# Patient Record
Sex: MALE | Race: WHITE | NOT HISPANIC OR LATINO | Employment: UNEMPLOYED | URBAN - NONMETROPOLITAN AREA
[De-identification: names, ages, dates, MRNs, and addresses within clinical notes are randomized per-mention and may not be internally consistent; named-entity substitution may affect disease eponyms.]

---

## 2023-10-05 ENCOUNTER — APPOINTMENT (INPATIENT)
Dept: ULTRASOUND IMAGING | Facility: HOSPITAL | Age: 63
DRG: 720 | End: 2023-10-05
Payer: COMMERCIAL

## 2023-10-05 ENCOUNTER — HOSPITAL ENCOUNTER (INPATIENT)
Facility: HOSPITAL | Age: 63
LOS: 2 days | Discharge: ANOTHER HOSPITAL | DRG: 720 | End: 2023-10-07
Attending: EMERGENCY MEDICINE | Admitting: FAMILY MEDICINE
Payer: COMMERCIAL

## 2023-10-05 ENCOUNTER — APPOINTMENT (EMERGENCY)
Dept: CT IMAGING | Facility: HOSPITAL | Age: 63
DRG: 720 | End: 2023-10-05
Payer: COMMERCIAL

## 2023-10-05 ENCOUNTER — APPOINTMENT (EMERGENCY)
Dept: RADIOLOGY | Facility: HOSPITAL | Age: 63
DRG: 720 | End: 2023-10-05
Payer: COMMERCIAL

## 2023-10-05 DIAGNOSIS — K56.609 SBO (SMALL BOWEL OBSTRUCTION) (HCC): ICD-10-CM

## 2023-10-05 DIAGNOSIS — A52.3 NEUROSYPHILIS: ICD-10-CM

## 2023-10-05 DIAGNOSIS — R41.0 CONFUSION: ICD-10-CM

## 2023-10-05 DIAGNOSIS — F03.B11 MODERATE DEMENTIA WITH AGITATION, UNSPECIFIED DEMENTIA TYPE (HCC): ICD-10-CM

## 2023-10-05 DIAGNOSIS — R65.20 SEVERE SEPSIS (HCC): Primary | ICD-10-CM

## 2023-10-05 DIAGNOSIS — K81.0 ACUTE CHOLECYSTITIS: ICD-10-CM

## 2023-10-05 DIAGNOSIS — N17.9 AKI (ACUTE KIDNEY INJURY) (HCC): ICD-10-CM

## 2023-10-05 DIAGNOSIS — A41.9 SEVERE SEPSIS (HCC): Primary | ICD-10-CM

## 2023-10-05 DIAGNOSIS — R56.9 SEIZURE (HCC): ICD-10-CM

## 2023-10-05 PROBLEM — E72.20 HYPERAMMONEMIA (HCC): Status: ACTIVE | Noted: 2023-10-05

## 2023-10-05 PROBLEM — F03.90 DEMENTIA (HCC): Status: ACTIVE | Noted: 2023-10-05

## 2023-10-05 PROBLEM — R94.31 PROLONGED QT INTERVAL: Status: ACTIVE | Noted: 2023-10-05

## 2023-10-05 PROBLEM — I51.7 CARDIOMEGALY: Status: ACTIVE | Noted: 2023-10-05

## 2023-10-05 PROBLEM — I10 HTN (HYPERTENSION): Status: ACTIVE | Noted: 2023-10-05

## 2023-10-05 PROBLEM — G93.41 ACUTE METABOLIC ENCEPHALOPATHY: Status: ACTIVE | Noted: 2023-10-05

## 2023-10-05 PROBLEM — I63.9 CVA (CEREBRAL VASCULAR ACCIDENT) (HCC): Status: ACTIVE | Noted: 2023-10-05

## 2023-10-05 LAB
2HR DELTA HS TROPONIN: -3 NG/L
4HR DELTA HS TROPONIN: -5 NG/L
ALBUMIN SERPL BCP-MCNC: 3.3 G/DL (ref 3.5–5)
ALP SERPL-CCNC: 89 U/L (ref 34–104)
ALT SERPL W P-5'-P-CCNC: 17 U/L (ref 7–52)
AMMONIA PLAS-SCNC: 99 UMOL/L (ref 18–72)
ANION GAP SERPL CALCULATED.3IONS-SCNC: 14 MMOL/L
APTT PPP: 39 SECONDS (ref 23–37)
AST SERPL W P-5'-P-CCNC: 32 U/L (ref 13–39)
ATRIAL RATE: 76 BPM
BACTERIA UR QL AUTO: ABNORMAL /HPF
BASE EX.OXY STD BLDV CALC-SCNC: 88.2 % (ref 60–80)
BASE EXCESS BLDV CALC-SCNC: -1.7 MMOL/L
BASOPHILS # BLD AUTO: 0.07 THOUSANDS/ÂΜL (ref 0–0.1)
BASOPHILS NFR BLD AUTO: 0 % (ref 0–1)
BILIRUB SERPL-MCNC: 0.56 MG/DL (ref 0.2–1)
BILIRUB UR QL STRIP: ABNORMAL
BNP SERPL-MCNC: 218 PG/ML (ref 0–100)
BUN SERPL-MCNC: 60 MG/DL (ref 5–25)
CALCIUM ALBUM COR SERPL-MCNC: 9.2 MG/DL (ref 8.3–10.1)
CALCIUM SERPL-MCNC: 8.6 MG/DL (ref 8.4–10.2)
CARDIAC TROPONIN I PNL SERPL HS: 23 NG/L
CARDIAC TROPONIN I PNL SERPL HS: 25 NG/L
CARDIAC TROPONIN I PNL SERPL HS: 28 NG/L
CHLORIDE SERPL-SCNC: 101 MMOL/L (ref 96–108)
CLARITY UR: ABNORMAL
CO2 SERPL-SCNC: 25 MMOL/L (ref 21–32)
COLOR UR: YELLOW
CREAT SERPL-MCNC: 3.57 MG/DL (ref 0.6–1.3)
EOSINOPHIL # BLD AUTO: 0.01 THOUSAND/ÂΜL (ref 0–0.61)
EOSINOPHIL NFR BLD AUTO: 0 % (ref 0–6)
ERYTHROCYTE [DISTWIDTH] IN BLOOD BY AUTOMATED COUNT: 16.6 % (ref 11.6–15.1)
FLUAV RNA RESP QL NAA+PROBE: NEGATIVE
FLUBV RNA RESP QL NAA+PROBE: NEGATIVE
GFR SERPL CREATININE-BSD FRML MDRD: 17 ML/MIN/1.73SQ M
GLUCOSE SERPL-MCNC: 123 MG/DL (ref 65–140)
GLUCOSE UR STRIP-MCNC: NEGATIVE MG/DL
HCO3 BLDV-SCNC: 22.4 MMOL/L (ref 24–30)
HCT VFR BLD AUTO: 42.2 % (ref 36.5–49.3)
HGB BLD-MCNC: 13.6 G/DL (ref 12–17)
HGB UR QL STRIP.AUTO: ABNORMAL
IMM GRANULOCYTES # BLD AUTO: 0.17 THOUSAND/UL (ref 0–0.2)
IMM GRANULOCYTES NFR BLD AUTO: 1 % (ref 0–2)
INR PPP: 1.21 (ref 0.84–1.19)
KETONES UR STRIP-MCNC: ABNORMAL MG/DL
LACTATE SERPL-SCNC: 1.1 MMOL/L (ref 0.5–2)
LEUKOCYTE ESTERASE UR QL STRIP: ABNORMAL
LYMPHOCYTES # BLD AUTO: 1.11 THOUSANDS/ÂΜL (ref 0.6–4.47)
LYMPHOCYTES NFR BLD AUTO: 6 % (ref 14–44)
MAGNESIUM SERPL-MCNC: 2.8 MG/DL (ref 1.9–2.7)
MCH RBC QN AUTO: 28.5 PG (ref 26.8–34.3)
MCHC RBC AUTO-ENTMCNC: 32.2 G/DL (ref 31.4–37.4)
MCV RBC AUTO: 89 FL (ref 82–98)
MONOCYTES # BLD AUTO: 2.89 THOUSAND/ÂΜL (ref 0.17–1.22)
MONOCYTES NFR BLD AUTO: 15 % (ref 4–12)
NEUTROPHILS # BLD AUTO: 15.39 THOUSANDS/ÂΜL (ref 1.85–7.62)
NEUTS SEG NFR BLD AUTO: 78 % (ref 43–75)
NITRITE UR QL STRIP: NEGATIVE
NON-SQ EPI CELLS URNS QL MICRO: ABNORMAL /HPF
NRBC BLD AUTO-RTO: 0 /100 WBCS
O2 CT BLDV-SCNC: 18.1 ML/DL
P AXIS: 27 DEGREES
PCO2 BLDV: 36.2 MM HG (ref 42–50)
PH BLDV: 7.41 [PH] (ref 7.3–7.4)
PH UR STRIP.AUTO: 5 [PH]
PLATELET # BLD AUTO: 182 THOUSANDS/UL (ref 149–390)
PMV BLD AUTO: 10.5 FL (ref 8.9–12.7)
PO2 BLDV: 58.6 MM HG (ref 35–45)
POTASSIUM SERPL-SCNC: 3.8 MMOL/L (ref 3.5–5.3)
PR INTERVAL: 140 MS
PROCALCITONIN SERPL-MCNC: 2.98 NG/ML
PROT SERPL-MCNC: 8 G/DL (ref 6.4–8.4)
PROT UR STRIP-MCNC: ABNORMAL MG/DL
PROTHROMBIN TIME: 15.6 SECONDS (ref 11.6–14.5)
QRS AXIS: -10 DEGREES
QRSD INTERVAL: 104 MS
QT INTERVAL: 438 MS
QTC INTERVAL: 492 MS
RBC # BLD AUTO: 4.77 MILLION/UL (ref 3.88–5.62)
RBC #/AREA URNS AUTO: ABNORMAL /HPF
RSV RNA RESP QL NAA+PROBE: NEGATIVE
SARS-COV-2 RNA RESP QL NAA+PROBE: NEGATIVE
SODIUM SERPL-SCNC: 140 MMOL/L (ref 135–147)
SP GR UR STRIP.AUTO: >=1.03 (ref 1–1.03)
T WAVE AXIS: 12 DEGREES
UROBILINOGEN UR QL STRIP.AUTO: 4 E.U./DL
VALPROATE SERPL-MCNC: 46 UG/ML (ref 50–100)
VENTRICULAR RATE: 76 BPM
WBC # BLD AUTO: 19.64 THOUSAND/UL (ref 4.31–10.16)
WBC #/AREA URNS AUTO: ABNORMAL /HPF

## 2023-10-05 PROCEDURE — 71250 CT THORAX DX C-: CPT

## 2023-10-05 PROCEDURE — 71045 X-RAY EXAM CHEST 1 VIEW: CPT

## 2023-10-05 PROCEDURE — 72125 CT NECK SPINE W/O DYE: CPT

## 2023-10-05 PROCEDURE — 74176 CT ABD & PELVIS W/O CONTRAST: CPT

## 2023-10-05 PROCEDURE — 80053 COMPREHEN METABOLIC PANEL: CPT | Performed by: PHYSICIAN ASSISTANT

## 2023-10-05 PROCEDURE — 87186 SC STD MICRODIL/AGAR DIL: CPT | Performed by: PHYSICIAN ASSISTANT

## 2023-10-05 PROCEDURE — 80164 ASSAY DIPROPYLACETIC ACD TOT: CPT | Performed by: FAMILY MEDICINE

## 2023-10-05 PROCEDURE — 81001 URINALYSIS AUTO W/SCOPE: CPT | Performed by: PHYSICIAN ASSISTANT

## 2023-10-05 PROCEDURE — 85730 THROMBOPLASTIN TIME PARTIAL: CPT | Performed by: PHYSICIAN ASSISTANT

## 2023-10-05 PROCEDURE — 99285 EMERGENCY DEPT VISIT HI MDM: CPT | Performed by: PHYSICIAN ASSISTANT

## 2023-10-05 PROCEDURE — 83735 ASSAY OF MAGNESIUM: CPT | Performed by: PHYSICIAN ASSISTANT

## 2023-10-05 PROCEDURE — 93005 ELECTROCARDIOGRAM TRACING: CPT

## 2023-10-05 PROCEDURE — 96365 THER/PROPH/DIAG IV INF INIT: CPT

## 2023-10-05 PROCEDURE — G1004 CDSM NDSC: HCPCS

## 2023-10-05 PROCEDURE — 36415 COLL VENOUS BLD VENIPUNCTURE: CPT | Performed by: PHYSICIAN ASSISTANT

## 2023-10-05 PROCEDURE — 83605 ASSAY OF LACTIC ACID: CPT | Performed by: PHYSICIAN ASSISTANT

## 2023-10-05 PROCEDURE — 0241U HB NFCT DS VIR RESP RNA 4 TRGT: CPT | Performed by: PHYSICIAN ASSISTANT

## 2023-10-05 PROCEDURE — 99223 1ST HOSP IP/OBS HIGH 75: CPT | Performed by: FAMILY MEDICINE

## 2023-10-05 PROCEDURE — 85025 COMPLETE CBC W/AUTO DIFF WBC: CPT | Performed by: PHYSICIAN ASSISTANT

## 2023-10-05 PROCEDURE — 82805 BLOOD GASES W/O2 SATURATION: CPT | Performed by: PHYSICIAN ASSISTANT

## 2023-10-05 PROCEDURE — 99285 EMERGENCY DEPT VISIT HI MDM: CPT

## 2023-10-05 PROCEDURE — 84145 PROCALCITONIN (PCT): CPT | Performed by: PHYSICIAN ASSISTANT

## 2023-10-05 PROCEDURE — 82140 ASSAY OF AMMONIA: CPT | Performed by: EMERGENCY MEDICINE

## 2023-10-05 PROCEDURE — 83880 ASSAY OF NATRIURETIC PEPTIDE: CPT | Performed by: PHYSICIAN ASSISTANT

## 2023-10-05 PROCEDURE — 87086 URINE CULTURE/COLONY COUNT: CPT | Performed by: PHYSICIAN ASSISTANT

## 2023-10-05 PROCEDURE — 70450 CT HEAD/BRAIN W/O DYE: CPT

## 2023-10-05 PROCEDURE — 87077 CULTURE AEROBIC IDENTIFY: CPT | Performed by: PHYSICIAN ASSISTANT

## 2023-10-05 PROCEDURE — 76705 ECHO EXAM OF ABDOMEN: CPT

## 2023-10-05 PROCEDURE — 85610 PROTHROMBIN TIME: CPT | Performed by: PHYSICIAN ASSISTANT

## 2023-10-05 PROCEDURE — 87040 BLOOD CULTURE FOR BACTERIA: CPT | Performed by: PHYSICIAN ASSISTANT

## 2023-10-05 PROCEDURE — 84484 ASSAY OF TROPONIN QUANT: CPT | Performed by: PHYSICIAN ASSISTANT

## 2023-10-05 RX ORDER — BISACODYL 5 MG/1
5 TABLET, DELAYED RELEASE ORAL DAILY PRN
COMMUNITY
End: 2023-10-07

## 2023-10-05 RX ORDER — ACETAMINOPHEN 325 MG/1
650 TABLET ORAL EVERY 4 HOURS PRN
COMMUNITY
End: 2023-10-07

## 2023-10-05 RX ORDER — DIVALPROEX SODIUM 125 MG/1
500 CAPSULE, COATED PELLETS ORAL EVERY 12 HOURS SCHEDULED
COMMUNITY
End: 2023-10-07

## 2023-10-05 RX ORDER — OLANZAPINE 10 MG/1
5 INJECTION, POWDER, LYOPHILIZED, FOR SOLUTION INTRAMUSCULAR EVERY 6 HOURS PRN
Status: DISCONTINUED | OUTPATIENT
Start: 2023-10-05 | End: 2023-10-07 | Stop reason: HOSPADM

## 2023-10-05 RX ORDER — BISACODYL 10 MG
10 SUPPOSITORY, RECTAL RECTAL DAILY PRN
COMMUNITY
End: 2023-10-07

## 2023-10-05 RX ORDER — CEFTRIAXONE 2 G/50ML
2000 INJECTION, SOLUTION INTRAVENOUS ONCE
Status: COMPLETED | OUTPATIENT
Start: 2023-10-05 | End: 2023-10-05

## 2023-10-05 RX ORDER — ATORVASTATIN CALCIUM 40 MG/1
40 TABLET, FILM COATED ORAL
COMMUNITY
End: 2023-10-07

## 2023-10-05 RX ORDER — HYDROMORPHONE HCL/PF 1 MG/ML
0.5 SYRINGE (ML) INJECTION
Status: DISCONTINUED | OUTPATIENT
Start: 2023-10-05 | End: 2023-10-07 | Stop reason: HOSPADM

## 2023-10-05 RX ORDER — LABETALOL HYDROCHLORIDE 5 MG/ML
5 INJECTION, SOLUTION INTRAVENOUS EVERY 6 HOURS PRN
Status: DISCONTINUED | OUTPATIENT
Start: 2023-10-05 | End: 2023-10-07 | Stop reason: HOSPADM

## 2023-10-05 RX ORDER — QUETIAPINE FUMARATE 50 MG/1
50 TABLET, EXTENDED RELEASE ORAL 2 TIMES DAILY
COMMUNITY
End: 2023-10-07

## 2023-10-05 RX ORDER — LABETALOL 200 MG/1
200 TABLET, FILM COATED ORAL 2 TIMES DAILY
COMMUNITY
End: 2023-10-07

## 2023-10-05 RX ORDER — ACETAMINOPHEN 650 MG/1
325 SUPPOSITORY RECTAL EVERY 4 HOURS PRN
Status: DISCONTINUED | OUTPATIENT
Start: 2023-10-05 | End: 2023-10-07 | Stop reason: HOSPADM

## 2023-10-05 RX ORDER — HEPARIN SODIUM 5000 [USP'U]/ML
5000 INJECTION, SOLUTION INTRAVENOUS; SUBCUTANEOUS EVERY 8 HOURS SCHEDULED
Status: DISCONTINUED | OUTPATIENT
Start: 2023-10-05 | End: 2023-10-07 | Stop reason: HOSPADM

## 2023-10-05 RX ORDER — POLYETHYLENE GLYCOL 3350 17 G/17G
17 POWDER, FOR SOLUTION ORAL DAILY
COMMUNITY
End: 2023-10-07

## 2023-10-05 RX ORDER — ASPIRIN 81 MG/1
81 TABLET, CHEWABLE ORAL DAILY
COMMUNITY
End: 2023-10-07

## 2023-10-05 RX ORDER — SODIUM CHLORIDE, SODIUM GLUCONATE, SODIUM ACETATE, POTASSIUM CHLORIDE, MAGNESIUM CHLORIDE, SODIUM PHOSPHATE, DIBASIC, AND POTASSIUM PHOSPHATE .53; .5; .37; .037; .03; .012; .00082 G/100ML; G/100ML; G/100ML; G/100ML; G/100ML; G/100ML; G/100ML
100 INJECTION, SOLUTION INTRAVENOUS CONTINUOUS
Status: DISCONTINUED | OUTPATIENT
Start: 2023-10-05 | End: 2023-10-07 | Stop reason: HOSPADM

## 2023-10-05 RX ORDER — MEMANTINE HYDROCHLORIDE 10 MG/1
10 TABLET ORAL 2 TIMES DAILY
COMMUNITY
End: 2023-10-07

## 2023-10-05 RX ADMIN — PIPERACILLIN AND TAZOBACTAM 3.38 G: 36; 4.5 INJECTION, POWDER, FOR SOLUTION INTRAVENOUS at 11:57

## 2023-10-05 RX ADMIN — HEPARIN SODIUM 5000 UNITS: 5000 INJECTION INTRAVENOUS; SUBCUTANEOUS at 22:29

## 2023-10-05 RX ADMIN — PIPERACILLIN AND TAZOBACTAM 3.38 G: 36; 4.5 INJECTION, POWDER, FOR SOLUTION INTRAVENOUS at 23:42

## 2023-10-05 RX ADMIN — SODIUM CHLORIDE, SODIUM GLUCONATE, SODIUM ACETATE, POTASSIUM CHLORIDE, MAGNESIUM CHLORIDE, SODIUM PHOSPHATE, DIBASIC, AND POTASSIUM PHOSPHATE 125 ML/HR: .53; .5; .37; .037; .03; .012; .00082 INJECTION, SOLUTION INTRAVENOUS at 16:25

## 2023-10-05 RX ADMIN — HEPARIN SODIUM 5000 UNITS: 5000 INJECTION INTRAVENOUS; SUBCUTANEOUS at 16:12

## 2023-10-05 RX ADMIN — TRIMETHOBENZAMIDE HYDROCHLORIDE 200 MG: 100 INJECTION INTRAMUSCULAR at 22:29

## 2023-10-05 RX ADMIN — LEVOCARNITINE 6000 MG: 1 INJECTION, SOLUTION INTRAVENOUS at 16:13

## 2023-10-05 RX ADMIN — CEFTRIAXONE 2000 MG: 2 INJECTION, SOLUTION INTRAVENOUS at 09:52

## 2023-10-05 RX ADMIN — SODIUM CHLORIDE 1000 ML: 0.9 INJECTION, SOLUTION INTRAVENOUS at 09:54

## 2023-10-05 RX ADMIN — PIPERACILLIN AND TAZOBACTAM 3.38 G: 36; 4.5 INJECTION, POWDER, FOR SOLUTION INTRAVENOUS at 17:54

## 2023-10-05 NOTE — ASSESSMENT & PLAN NOTE
· Evident on CT he does have tenderness in the right upper quadrant but LFTs are normal discussed with surgery proceed with ultrasound may need a HIDA. Proceed with antibiotics.

## 2023-10-05 NOTE — Clinical Note
Case was discussed with sergio and the patient's admission status was agreed to be Admission Status: inpatient status to the service of Dr. Victor Hugo Gee .

## 2023-10-05 NOTE — CASE MANAGEMENT
Case Management Discharge Planning Note    Patient name Argelia Coyne  Location /-01 MRN [id number]  : 1960 Date 10/5/2023       Current Admission Date: 10/5/2023  Current Admission Diagnosis:Severe sepsis [facility])   Patient Active Problem List    Diagnosis Date Noted   • Severe sepsis ([address]) 10/05/2023   • Acute metabolic encephalopathy    • Acute kidney injury ([address]) 10/05/2023   • Hyperammonemia ([address]) 10/05/2023   • Acute cholecystitis 10/05/2023   • SBO (small bowel obstruction) ([address]) 10/05/2023   • Prolonged QT interval 10/05/2023   • Cardiomegaly 10/05/2023   • Dementia ([address]) 10/05/2023   • Neurosyphilis 10/05/2023   • HTN (hypertension) 10/05/2023   • CVA (cerebral vascular accident) ([address]) 10/05/2023      LOS (days): 0  Geometric Mean LOS (GMLOS) (days):   Days to GMLOS:     OBJECTIVE:  Risk of Unplanned Readmission Score: 12.74         Current admission status: Inpatient   Preferred Pharmacy: No Pharmacies Listed  Primary Care Provider: Ana Maria Christopher MD    Primary Insurance: [facility]  Secondary Insurance:     DISCHARGE DETAILS:        [place] submitted Karolyn Titus referral to [facility] for baseline information on patient who appears to be LTC.

## 2023-10-05 NOTE — ASSESSMENT & PLAN NOTE
· Does have a tendency of aggressive behavior he was on Depakote for the psychosis he is also on Seroquel hold both in light of above

## 2023-10-05 NOTE — ASSESSMENT & PLAN NOTE
· Suspect in relation to Depakote as he has no history of liver cirrhosis. Start L-carnitine 100 mg/kg IV over 30 minutes followed by 50 mg/kg every 8 hours till see improvement. His ammonia level is 99 check a total Depakote level hold Depakote for now.   We will have to consult psychiatry in terms of the Depakote adjustment as this is formodd disorder, once taking po

## 2023-10-05 NOTE — ASSESSMENT & PLAN NOTE
· Blood pressure is controlled he is on labetalol 200 mg twice a day we will hold secondary to n.p.o. status we will place just Lopressor as needed if SBP is greater than 160

## 2023-10-05 NOTE — ASSESSMENT & PLAN NOTE
· Severe cardiomegaly evident on the imaging there is no evidence of any fluid overload we will proceed with a 2D echo as I do not see any work-up in the previous records

## 2023-10-05 NOTE — ASSESSMENT & PLAN NOTE
· His enteritis in the CT abdomen and pelvis evaluated by surgery n.p.o. IV fluids if develops nausea vomiting we will proceed with NG tube. He does have bowel sounds but he does is distended.   Tigan as needed as QTc is 492

## 2023-10-05 NOTE — ASSESSMENT & PLAN NOTE
· Usual baseline is he is awake and oriented to self only he does get combative he does groan. Patient has history of dementia , neurosyphilis and CVA currently he is lethargic but able to state his name. Is multifactorial and severe sepsis with infectious etiology CT of the brain is negative for acute pathology. Also has hyperammonemia and suspect secondary to Depakote as he has no history of liver cirrhosis. · We will do treatment for above. Also check a TSH.   Check a Depakote level

## 2023-10-05 NOTE — ASSESSMENT & PLAN NOTE
· Met criteria by leukocytosis with tachypnea source of infection is acute cystitis with a suspected acute cholecystitis no evidence of pneumonia as CT chest did not reveal any abnormalities or consolidation in the chest.  We will continue Isolyte at 125 mill per hour.   · Lactic acid is negative but he does have altered mental status with a creatinine of greater than 2  · Blood cultures trend procalcitonin consult infectious disease  · Work-up for the above in progress as well start Zosyn renally dosed

## 2023-10-05 NOTE — ASSESSMENT & PLAN NOTE
· Secondary to severe sepsis prerenal although there is no obstructive uropathy on the CT abdomen and pelvis bladder scan done 138 we will continue to monitor hydrate 125 mill per hour. The usual creatinine is baseline at 1 consult nephrology.

## 2023-10-05 NOTE — SEPSIS NOTE
Sepsis Note   Fela Gore 61 y.o. male MRN: [id number]  Unit/Bed#: ED 09 Encounter: [id number]       Initial Sepsis Screening     Row Name 10/05/23 4478                Is the patient's history suggestive of a new or worsening infection? Yes (Proceed)  -SB        Suspected source of infection pneumonia  -SB        Indicate SIRS criteria Leukocytosis (WBC > 80929 IJL) OR Leukopenia (WBC <4000 IJL) OR Bandemia (WBC >10% bands); Hyperthemia > 38.3C (100.9F) OR Hypothermia <36C (96.8F)  -SB        Are two or more of the above signs & symptoms of infection both present and new to the patient? Yes (Proceed)  -SB        Assess for evidence of organ dysfunction: Are any of the below criteria present within 6 hours of suspected infection and SIRS criteria that are NOT considered to be chronic conditions? Creatinine > 2.0  -SB        Date of presentation of severe sepsis 10/05/23  -SB        Time of presentation of severe sepsis 0948  -SB        Sepsis Note: Click "NEXT" below (NOT "close") to generate sepsis note based on above information. YES (proceed by clicking "NEXT")  -SB              User Key  (r) = Recorded By, (t) = Taken By, (c) = Cosigned By    [facility] Name Provider Type    RJEI Manzano PA-C Physician Assistant                Default [address] [place] [address] (last 720 hours)     Sepsis Reassess     [address] Name 10/05/23 1143                   Repeat Volume Status and Tissue Perfusion Assessment Performed    Date of Reassessment: 10/05/23  -SB        Time of Reassessment: 0893  -SB        Sepsis Reassessment Note: Click "NEXT" below (NOT "close") to generate sepsis reassessment note. YES (proceed by clicking "NEXT")  -SB        Repeat Volume Status and Tissue Perfusion Assessment Performed --              User Key  (r) = Recorded By, (t) = Taken By, (c) = Cosigned By    [address] [facility] Name Provider Type    Yohana Brown Physician Assistant                Body mass index is 35.22 kg/m².   Wt Readings from Last 1 Encounters:   10/05/23 96 kg (211 lb 10.3 oz)     IBW (Ideal Body Weight): 61.5 kg    Ideal body weight: 61.5 kg (135 lb 9.3 oz)  Adjusted ideal body weight: 75.3 kg (166 lb 0.1 oz)

## 2023-10-05 NOTE — ED ATTENDING ATTESTATION
10/5/2023  Merissa Iqbal DO, saw and evaluated the patient. I have discussed the patient with the resident/non-physician practitioner and agree with the resident's/non-physician practitioner's findings, Plan of Care, and MDM as documented in the resident's/non-physician practitioner's note, except where noted. All available labs and Radiology studies were reviewed. I was present for key portions of any procedure(s) performed by the resident/non-physician practitioner and I was immediately available to provide assistance. At this point I agree with the current assessment done in the Emergency Department. I have conducted an independent evaluation of this patient a history and physical is as follows:    ED Course     Patient is a 60-year-old male seen and evaluated with the physician assistant. Patient is from an area nursing facility. Was diagnosed yesterday with pneumonia. Presents today because he has having change in mental status. There was reported the patient being "hypoxic."  She does not normally require oxygen at baseline. Also ox on room air at our facility is 91%. Appears clinically dehydrated. My evaluation shows the patient to be confused. Heart was regular. Lungs were with diminished breath sounds throughout. Abdomen was distended and tender. Patient minimally participates in the exam.    Nursing identified this patient as a sepsis alert. Patient is receiving a sepsis work-up. We will follow along with physician assistant. IV fluids have been administered. Patient is also receiving antibiotics. Cultures have been obtained. Evaluation to the emergency room found the patient to have acute cholecystitis. He was subsequently referred to surgical services.   Please see physician assistant note

## 2023-10-05 NOTE — ED NOTES
Eminence txt sent to [facility] charge RN, pt to be transported to unit, no, s/s of distress     Marcia Davis RN  10/05/23 4702

## 2023-10-05 NOTE — SEPSIS NOTE
Sepsis Note   Raymon Hancock 61 y.o. male MRN: [id number]  Unit/Bed#: ED 09 Encounter: [id number]       Initial Sepsis Screening     Row Name 10/05/23 0947                Is the patient's history suggestive of a new or worsening infection? Yes (Proceed)  -SB        Suspected source of infection pneumonia  -SB        Indicate SIRS criteria Leukocytosis (WBC > 17482 IJL) OR Leukopenia (WBC <4000 IJL) OR Bandemia (WBC >10% bands); Hyperthemia > 38.3C (100.9F) OR Hypothermia <36C (96.8F)  -SB        Are two or more of the above signs & symptoms of infection both present and new to the patient? Yes (Proceed)  -SB        Assess for evidence of organ dysfunction: Are any of the below criteria present within 6 hours of suspected infection and SIRS criteria that are NOT considered to be chronic conditions? Creatinine > 2.0  -SB        Date of presentation of severe sepsis 10/05/23  -SB        Time of presentation of severe sepsis 0948  -SB        Sepsis Note: Click "NEXT" below (NOT "close") to generate sepsis note based on above information. YES (proceed by clicking "NEXT")  -SB              User Key  (r) = Recorded By, (t) = Taken By, (c) = Cosigned By    [address] [place] [address] Name Provider Type    Yohana Lockett Physician Assistant                    There is no height or weight on file to calculate BMI. Wt Readings from Last 1 Encounters:   10/05/23 96 kg (211 lb 10.3 oz)        Height is required to calculate ideal body weight.

## 2023-10-05 NOTE — ED PROVIDER NOTES
History  Chief Complaint   Patient presents with   • Altered Mental Status     Patient brought in by EMS from [facility] for several falls, AMS, pneumonia, elevated WBC and hypoxia. The patient is a 79-year-old male medical history of dementia EMS from nursing facility for the concern of altered mental status, hypoxia and pneumonia. Nursing facility states that he not require oxygen at home at baseline. Over the last few days he has had multiple falls at the nursing home. Patient yesterday began to become more confused per nursing staff and an x-ray was obtained by the physician at the nursing home. Lab work was also performed. Lab work illustrated a creatinine of 2.13 and GFR 34, WBC 12.7, and pneumonia on the chest x-ray. Patient today was 85% on room air placed on oxygen. Family members were made aware and requested medical evaluation. Aspiratory testing/viral panel unremarkable from the nursing facility as of yesterday. Altered Mental Status  Presenting symptoms: confusion    Most recent episode:  Yesterday  Episode history:  Continuous  Timing:  Constant  Progression:  Worsening  Chronicity:  New  Context: dementia, head injury, nursing home resident and recent illness    Recent head injury:  Unable to specify  Associated symptoms: difficulty breathing    Associated symptoms: no abdominal pain, normal movement, no agitation, no fever, no nausea, no seizures and no weakness    Difficulty breathing:     Timing:  Constant      None       No past medical history on file. No past surgical history on file. No family history on file. I have reviewed and agree with the history as documented. E-Cigarette/Vaping     E-Cigarette/Vaping Substances     Social History     Tobacco Use   • Smoking status: Unknown   Substance Use Topics   • Drug use: Not Currently       Review of Systems   Constitutional: Negative for fever. Gastrointestinal: Negative for abdominal pain and nausea. Neurological: Negative for seizures and weakness. Psychiatric/Behavioral: Positive for confusion. Negative for agitation. All other systems reviewed and are negative. Physical Exam  Physical Exam  Vitals and nursing note reviewed. Constitutional:       General: He is not in acute distress. Appearance: He is well-developed. HENT:      Head: Normocephalic and atraumatic. Mouth/Throat:      Mouth: Mucous membranes are dry. Pharynx: Oropharynx is clear. Uvula midline. Eyes:      Extraocular Movements: Extraocular movements intact. Pupils: Pupils are equal, round, and reactive to light. Cardiovascular:      Rate and Rhythm: Normal rate and regular rhythm. Heart sounds: Normal heart sounds. No murmur heard. Pulmonary:      Effort: Pulmonary effort is normal. No tachypnea, bradypnea or respiratory distress. Breath sounds: Examination of the right-lower field reveals wheezing. Wheezing and rhonchi present. Chest:      Chest wall: No tenderness. Abdominal:      General: Bowel sounds are normal.      Palpations: Abdomen is soft. Tenderness: There is abdominal tenderness in the right upper quadrant and epigastric area. There is no right CVA tenderness or left CVA tenderness. Musculoskeletal:      Cervical back: Normal range of motion. Skin:     General: Skin is warm and dry. Capillary Refill: Capillary refill takes less than 2 seconds. Neurological:      Mental Status: He is alert. He is confused.    Psychiatric:         Behavior: Behavior normal.         Vital Signs  ED Triage Vitals   Temperature Pulse Respirations Blood Pressure SpO2   10/05/23 0941 10/05/23 0929 10/05/23 0929 10/05/23 0929 10/05/23 0929   97.6 °F (36.4 °C) 75 20 108/66 92 %      Temp Source Heart Rate Source Patient Position - Orthostatic VS BP Location FiO2 (%)   10/05/23 0941 10/05/23 0929 10/05/23 0929 10/05/23 0929 --   Rectal Monitor Lying Left arm       Pain Score       10/05/23 3416 No Pain           Vitals:    10/05/23 1200 10/05/23 1215 10/05/23 1230 10/05/23 1246   BP: 110/73 120/77 112/71 120/82   Pulse: 70 71 70 73   Patient Position - Orthostatic VS:             Visual Acuity      ED Medications  Medications   cefTRIAXone (ROCEPHIN) IVPB (premix in dextrose) 2,000 mg 50 mL (0 mg Intravenous Stopped 10/5/23 1029)   sodium chloride 0.9 % bolus 1,000 mL (0 mL Intravenous Stopped 10/5/23 1037)   piperacillin-tazobactam (ZOSYN) 3.375 g in sodium chloride 0.9 % 100 mL IVPB (0 g Intravenous Stopped 10/5/23 1233)       Diagnostic Studies  Results Reviewed     Procedure Component Value Units Date/Time    HS Troponin I 4hr [[id number]] Collected: 10/05/23 1400    Lab Status: In process Specimen: Blood from Arm, Right Updated: 10/05/23 1402    B-Type Natriuretic Peptide(BNP) [[id number]]  (Abnormal) Collected: 10/05/23 0938    Lab Status: Final result Specimen: Blood from Arm, Left Updated: 10/05/23 1235      pg/mL     HS Troponin I 2hr [[id number]]  (Normal) Collected: 10/05/23 1151    Lab Status: Final result Specimen: Blood from Arm, Right Updated: 10/05/23 1234     hs TnI 2hr 25 ng/L      Delta 2hr hsTnI -3 ng/L     Urine Microscopic [[id number]]  (Abnormal) Collected: 10/05/23 1037    Lab Status: Final result Specimen: Urine, Straight Cath Updated: 10/05/23 1053     RBC, UA Innumerable /hpf      WBC, UA Innumerable /hpf      Epithelial Cells Occasional /hpf      Bacteria, UA Innumerable /hpf     Urine culture [[id number]] Collected: 10/05/23 1037    Lab Status:  In process Specimen: Urine, Straight Cath Updated: 10/05/23 1053    UA w Reflex to Microscopic w Reflex to Culture [[id number]]  (Abnormal) Collected: 10/05/23 1037    Lab Status: Final result Specimen: Urine, Straight Cath Updated: 10/05/23 1042     Color, UA Yellow     Clarity, UA Cloudy     Specific Gravity, UA >=1.030     pH, UA 5.0     Leukocytes, UA Moderate     Nitrite, UA Negative     Protein,  (2+) mg/dl Glucose, UA Negative mg/dl      Ketones, UA Trace mg/dl      Urobilinogen, UA 4.0 E.U./dl      Bilirubin, UA Moderate     Occult Blood, UA Large    FLU/RSV/COVID - if FLU/RSV clinically relevant [[id number]]  (Normal) Collected: 10/05/23 0938    Lab Status: Final result Specimen: Nares from Nose Updated: 10/05/23 1024     SARS-CoV-2 Negative     INFLUENZA A PCR Negative     INFLUENZA B PCR Negative     RSV PCR Negative    Narrative:      FOR PEDIATRIC PATIENTS - copy/paste COVID Guidelines URL to browser: [email or web address]    SARS-CoV-2 assay is a Nucleic Acid Amplification assay intended for the  qualitative detection of nucleic acid from SARS-CoV-2 in nasopharyngeal  swabs. Results are for the presumptive identification of SARS-CoV-2 RNA. Positive results are indicative of infection with SARS-CoV-2, the virus  causing COVID-19, but do not rule out bacterial infection or co-infection  with other viruses. Laboratories within the [facility] and its  territories are required to report all positive results to the appropriate  public health authorities. Negative results do not preclude SARS-CoV-2  infection and should not be used as the sole basis for treatment or other  patient management decisions. Negative results must be combined with  clinical observations, patient history, and epidemiological information. This test has not been FDA cleared or approved. This test has been authorized by FDA under an Emergency Use Authorization  (EUA). This test is only authorized for the duration of time the  declaration that circumstances exist justifying the authorization of the  emergency use of an in vitro diagnostic tests for detection of SARS-CoV-2  virus and/or diagnosis of COVID-19 infection under section 564(b)(1) of  the Act, 21 U. S.C. 238RCO-2(G)(5), unless the authorization is terminated  or revoked sooner.  The test has been validated but independent review by FDA  and CLIA is pending. Test performed using "[employer] GeneXpert: This RT-PCR assay targets N2,  a region unique to SARS-CoV-2. A conserved region in the E-gene was chosen  for pan-Sarbecovirus detection which includes SARS-CoV-2. According to CMS-2020-01-R, this platform meets the definition of high-throughput technology. Ammonia [[id number]]  (Abnormal) Collected: 10/05/23 1004    Lab Status: Final result Specimen: Blood from Arm, Left Updated: 10/05/23 1022     Ammonia 99 umol/L     Procalcitonin [[id number]]  (Abnormal) Collected: 10/05/23 0938    Lab Status: Final result Specimen: Blood from Arm, Left Updated: 10/05/23 1014     Procalcitonin 2.98 ng/ml     HS Troponin 0hr (reflex protocol) [[id number]]  (Normal) Collected: 10/05/23 0938    Lab Status: Final result Specimen: Blood from Arm, Left Updated: 10/05/23 1011     hs TnI 0hr 28 ng/L     Lactic acid [[id number]]  (Normal) Collected: 10/05/23 0938    Lab Status: Final result Specimen: Blood from Arm, Left Updated: 10/05/23 1008     LACTIC ACID 1.1 mmol/L     Narrative:      Result may be elevated if tourniquet was used during collection.     Comprehensive metabolic panel [[id number]]  (Abnormal) Collected: 10/05/23 0938    Lab Status: Final result Specimen: Blood from Arm, Left Updated: 10/05/23 1008     Sodium 140 mmol/L      Potassium 3.8 mmol/L      Chloride 101 mmol/L      CO2 25 mmol/L      ANION GAP 14 mmol/L      BUN 60 mg/dL      Creatinine 3.57 mg/dL      Glucose 123 mg/dL      Calcium 8.6 mg/dL      Corrected Calcium 9.2 mg/dL      AST 32 U/L      ALT 17 U/L      Alkaline Phosphatase 89 U/L      Total Protein 8.0 g/dL      Albumin 3.3 g/dL      Total Bilirubin 0.56 mg/dL      eGFR 17 ml/min/1.73sq m     Narrative:      [place] guidelines for Chronic Kidney Disease (CKD):   •  Stage 1 with normal or high GFR (GFR > 90 mL/min/1.73 square meters)  •  Stage 2 Mild CKD (GFR = 60-89 mL/min/1.73 square meters)  • Stage 3A Moderate CKD (GFR = 45-59 mL/min/1.73 square meters)  •  Stage 3B Moderate CKD (GFR = 30-44 mL/min/1.73 square meters)  •  Stage 4 Severe CKD (GFR = 15-29 mL/min/1.73 square meters)  •  Stage 5 End Stage CKD (GFR <15 mL/min/1.73 square meters)  Note: GFR calculation is accurate only with a steady state creatinine    Magnesium [[id number]]  (Abnormal) Collected: 10/05/23 0938    Lab Status: Final result Specimen: Blood from Arm, Left Updated: 10/05/23 1008     Magnesium 2.8 mg/dL     Protime-INR [[id number]]  (Abnormal) Collected: 10/05/23 0938    Lab Status: Final result Specimen: Blood from Arm, Left Updated: 10/05/23 1001     Protime 15.6 seconds      INR 1.21    APTT [[id number]]  (Abnormal) Collected: 10/05/23 0938    Lab Status: Final result Specimen: Blood from Arm, Left Updated: 10/05/23 1001     PTT 39 seconds     Blood culture #2 [[id number]] Collected: 10/05/23 0946    Lab Status:  In process Specimen: Blood from Arm, Right Updated: 10/05/23 0953    Blood gas, Venous [[id number]]  (Abnormal) Collected: 10/05/23 0938    Lab Status: Final result Specimen: Blood from Arm, Left Updated: 10/05/23 0949     pH, Yovany 7.410     pCO2, Yovany 36.2 mm Hg      pO2, Yovany 58.6 mm Hg      HCO3, Yovany 22.4 mmol/L      Base Excess, Yovany -1.7 mmol/L      O2 Content, Yovany 18.1 ml/dL      O2 HGB, VENOUS 88.2 %     CBC and differential [[id number]]  (Abnormal) Collected: 10/05/23 0938    Lab Status: Final result Specimen: Blood from Arm, Left Updated: 10/05/23 0947     WBC 19.64 Thousand/uL      RBC 4.77 Million/uL      Hemoglobin 13.6 g/dL      Hematocrit 42.2 %      MCV 89 fL      MCH 28.5 pg      MCHC 32.2 g/dL      RDW 16.6 %      MPV 10.5 fL      Platelets 503 Thousands/uL      nRBC 0 /100 WBCs      Neutrophils Relative 78 %      Immat GRANS % 1 %      Lymphocytes Relative 6 %      Monocytes Relative 15 %      Eosinophils Relative 0 %      Basophils Relative 0 %      Neutrophils Absolute 15.39 Thousands/µL      Immature Grans Absolute 0.17 Thousand/uL      Lymphocytes Absolute 1.11 Thousands/µL      Monocytes Absolute 2.89 Thousand/µL      Eosinophils Absolute 0.01 Thousand/µL      Basophils Absolute 0.07 Thousands/µL     Blood culture #1 [[id number]] Collected: 10/05/23 0938    Lab Status: In process Specimen: Blood from Arm, Left Updated: 10/05/23 0944                 CT head without contrast   Final Result by Jeremie Mathews MD (10/05 1121)      No acute findings. Workstation performed: [id number]         CT cervical spine without contrast   Final Result by Jeremie Mathews MD (10/05 1124)      No cervical spine fracture or traumatic malalignment. Workstation performed: [id number]         CT chest abdomen pelvis wo contrast   Final Result by Jeremie Mathews MD (10/05 1135)      Cholelithiasis with pericholecystic fluid and mild surrounding fat stranding in keeping with acute cholecystitis. Diffuse proximal and mid small bowel distention without a transition point in keeping with a partial obstruction or enteritis. Fluid throughout the length of the colon which is nondistended is in keeping with a nonspecific diarrheal illness. No acute findings in the chest.      The study was marked in EPIC for immediate notification. Workstation performed: [id number]         XR chest portable   Final Result by Li Olvera MD (10/05 1050)      1. Findings likely representing cardiomegaly and mild interstitial pulmonary edema, although findings may be accentuated by low lung volumes and portable technique. 2. Bibasilar atelectasis. No consolidation.          Workstation performed: [id number]         US abdomen limited    (Results Pending)              Procedures  ECG 12 Lead Documentation Only    Date/Time: 10/5/2023 10:25 AM    Performed by: Tisha Marley PA-C  Authorized by: Tisha Marley PA-C    Indications / Diagnosis:  Ams  Patient location:  ED  Previous ECG:     Previous ECG:  Unavailable  Interpretation:     Interpretation: non-specific    Rate:     ECG rate:  76    ECG rate assessment: normal    Rhythm:     Rhythm: sinus rhythm    ST segments:     ST segments:  Non-specific  T waves:     T waves: non-specific               ED Course  ED Course as of 10/05/23 1422   Thu Oct 05, 2023   1055 Creatinine(!): 3.57  TERE   1056 WBC(!): 19.64   1146 Despite the presence of hypotension and/or significantly elevated lactate of = 4 and/or presence of septic shock, resuscitation with 30 ml/kg was withheld at this time because of concern for volume overload. . Patient will instead be, or has been, resuscitated with 1L of crystalloid fluid. Order for these fluids have been placed and additional resuscitation will be provided based on the clinical condition of the patient. 46 Dr. Olga Song made aware with general surgery    Dr. Carly Gamboa accepted under [facility] and [address]                             Initial Sepsis Screening     [address] Name 10/05/23 0947                Is the patient's history suggestive of a new or worsening infection? Yes (Proceed)  -SB        Suspected source of infection pneumonia  -SB        Indicate SIRS criteria Leukocytosis (WBC > 71875 IJL) OR Leukopenia (WBC <4000 IJL) OR Bandemia (WBC >10% bands); Hyperthemia > 38.3C (100.9F) OR Hypothermia <36C (96.8F)  -SB        Are two or more of the above signs & symptoms of infection both present and new to the patient? Yes (Proceed)  -SB        Assess for evidence of organ dysfunction: Are any of the below criteria present within 6 hours of suspected infection and SIRS criteria that are NOT considered to be chronic conditions? Creatinine > 2.0  -SB        Date of presentation of severe sepsis 10/05/23  -SB        Time of presentation of severe sepsis 0948  -SB        Sepsis Note: Click "NEXT" below (NOT "close") to generate sepsis note based on above information.  YES (proceed by clicking "NEXT")  -SB              User Key  (r) = Recorded By, (t) = Taken By, (c) = Cosigned By    [address] [facility] Name Provider Type    REJI Seay PA-C Physician Assistant              Default [facility] (last 720 hours)     Sepsis Reassess     [address] Name 10/05/23 1143                   Repeat Volume Status and Tissue Perfusion Assessment Performed    Date of Reassessment: 10/05/23  -SB        Time of Reassessment: 0263  -SB        Sepsis Reassessment Note: Click "NEXT" below (NOT "close") to generate sepsis reassessment note. YES (proceed by clicking "NEXT")  -SB        Repeat Volume Status and Tissue Perfusion Assessment Performed --              User Key  (r) = Recorded By, (t) = Taken By, (c) = Cosigned By    [address] [facility] Name Provider Type    REJI Seay PA-C Physician Assistant                            Medical Decision Making  The patient is a 29-year-old male medical history of dementia EMS from nursing facility for the concern of altered mental status, hypoxia and pneumonia. Nursing facility states that he not require oxygen at home at baseline. Over the last few days he has had multiple falls at the nursing home. Patient yesterday began to become more confused per nursing staff and an x-ray was obtained by the physician at the nursing home. Lab work was also performed. Lab work illustrated a creatinine of 2.13 and GFR 34, WBC 12.7, and pneumonia on the chest x-ray. Patient today was 85% on room air placed on oxygen. Family members were made aware and requested medical evaluation. Aspiratory testing/viral panel unremarkable from the nursing facility as of yesterday. Examination the patient is confused, awake and following commands. Mucous membranes dry and rhonchi on examination. Patient is 87% on room air. tenderness in the upper abdomen. Lab work was consistent with severe sepsis, was covered empirically with Rocephin.   Patient's x-ray was unremarkable for any acute infiltrate or pneumonia. Patient's imaging studies did reveal acute cholecystitis. Patient was given IV fluids 1 L NSS and covered again Zosyn. Patient was discussed with hospitalist service for admission and general surgery for consultation regarding the acute cholecystitis      Differential diagnosis was included but not limited to: GERD, gastritis, PUD, esophageal spasm, pancreatitis, acute cholecystitis, acute cholangitis, biliary colic, acute cystitis, renal colic, kidney stone, MSK pain, AAA, urinary retention, colitis, diverticulitis, constipation, proctitis, small bowel obstruction, large bowel obstruction, mass, viral syndrome, ACS, traumatic injury, pneumonia, sepsis       Amount and/or Complexity of Data Reviewed  Independent Historian: EMS  External Data Reviewed: labs. Labs: ordered. Decision-making details documented in ED Course. Radiology: ordered. Decision-making details documented in ED Course. ECG/medicine tests: ordered. Decision-making details documented in ED Course. Discussion of management or test interpretation with external provider(s): Dr. Frances Waller and Dr. Zackary Bonilla drug management. Decision regarding hospitalization.           Disposition  Final diagnoses:   Severe sepsis ([address])   Confusion   Acute cholecystitis   TERE (acute kidney injury) ([address])     Time reflects when diagnosis was documented in both MDM as applicable and the Disposition within this note     Time User Action Codes Description Comment    10/5/2023 10:57 AM Charma No Add [A41.9,  R65.20] Severe sepsis ([address])     10/5/2023 10:57 AM Charma No Add [R41.0] Confusion     10/5/2023 11:40 AM Charma No Add [K81.0] Acute cholecystitis     10/5/2023 11:41 AM Charma No Add [N17.9] TERE (acute kidney injury) [facility])       ED Disposition     ED Disposition   Admit    Condition   Stable    Date/Time   u Oct 5, 2023 11:47 AM    Comment   Case was discussed with sergio and the patient's admission status was agreed to be Admission Status: inpatient status to the service of Dr. Grande Members    None         There are no discharge medications for this patient. No discharge procedures on file.     PDMP Review     None          ED Provider  Electronically Signed by           Lauren Beck PA-C  10/05/23 0193

## 2023-10-05 NOTE — ASSESSMENT & PLAN NOTE
· Does have a tendency towards behavioral disturbance he is on Seroquel over there he is also on Namenda unfortunately he is n.p.o. secondary to SBO we will not start till able to take p.o. we will place naproxen as needed for agitation.

## 2023-10-05 NOTE — CONSULTS
Consultation - General Surgery   Zoila Jones 61 y.o. male MRN: [id number]  Unit/Bed#: ED 09 Encounter: [id number]    Assessment:  61year old male with PMH for dementia who presents with worsening confusion and falls, found to have findings concerning for acute cholecystitis and possible SBO vs enteritis. -ct scan of head and spine did not show any acute findings in his head or spine. -CT scan of chest/abd/pelvis showing severe cardiomegaly, gallstones with pericholecystic fluid and mild surrounding fat stranding suspicious for acute cholecystitis and diffuse proximal to mid small bowel distention without a transition point keeping with a partial obstruction or enteritis. Fluid throughout the length of the colon indicates an associated diarrheal illness.      -leukocytosis 19 (12 10/04/2023)  -hgb 13.6  -Creat 3.57 (2.13)  -BUN 60 (36)  -Lactic acid 1.1  -procalcitonin 2.98  -Blood cultures X2 pending  -Troponin WNL X1  -Flu/rsv/covid neg  -BTNP 200    -He is noted to have worsening leukocytosis of 19, elevated procal of 2.8, normal lactic acid, and worsening renal function with creat of 3.57 and bun of 60. Afebrile, but evidence of hypoxia requiring 2.5 liters NC sats of 93% with tachypnea. -He was started on rocephin and zosyn by ED with fluid bolus of 1000mls administered.     -He does not believe he has had any abdominal surgeries previously. Plan:  -exam and ct scan are concerning for possible acute cholecystitis  -RUQ US for further evaluation  -? IR evaluation if acute lesly confirmed given his overall medical status  -treat suspected SBO vs enteritis conservatively  -agree with zosyn  -npo  -IV fluids  -if worsening nausea or vomiting develops consider NGT, but unsure how compliant pt would be with this      Remainder of care per primary service    History of Present Illness   Chief Complaint: increasing confusion and falls    HPI:  Zoila Jones is a 61 y.o. male with past medical history significant for dementia who initially presented to [address] [place] [address] ED with complaints of frequent falls at [facility] for the past few days, hypoxemia, leukocytosis, and increased confusion. HPI limited by patient's underlying dementia, therefore majority of information obtained from chart review and ED provider report. Per provider, he was brought from SNF due to frequent falls for the past few days and inability to maintain his balance. He has been noted to have increased confusion as well. This lead to CXR and lab work being obtained by facility yesterday. Initially CXR was read as pneumonia with leukocytosis WBCs 12, but he continued to worsen, thus prompting ED evaluation today. Given his multiple falls, he underwent pan scan which did not show any acute findings in his head or spine. CT scan of chest showing severe cardiomegaly, gallstones with pericholecystic fluid and mild surrounding fat stranding suspicious for acute cholecystitis and diffuse proximal to mid small bowel distention without a transition point keeping with a partial obstruction or enteritis. Fluid throughout the length of the colon indicates an associated diarrheal illness. He is noted to have worsening leukocytosis of 19, elevated procal of 2.8, normal lactic acid, and worsening renal function with creat of 3.57 and bun of 60. Afebrile, but evidence of hypoxia requiring 2.5 liters NC sats of 93% with tachypnea. He was started on rocephin and zosyn by ED with fluid bolus of 1000mls administered. He does not believe he has had any abdominal surgeries previously. He tells me he always has nausea and thinks he vomited today. Says he believes he moved his bowels twice today but is unable to give any further details. Says he has abdominal pain, but that he always does.      Per ED provider, his family has requested he be transferred to [facility] in [facility] where his family resides if he requires anything. Review of Systems   Unable to perform ROS: Dementia (limited by)   Constitutional: Negative for fever. Respiratory: Negative for shortness of breath. Cardiovascular: Negative for chest pain. Gastrointestinal: Positive for abdominal pain, nausea and vomiting. Historical Information   No past medical history on file. No past surgical history on file. Social History   Social History     Substance and Sexual Activity   Alcohol Use Not on file     Social History     Substance and Sexual Activity   Drug Use Not on file     No existing history information found. No existing history information found. Social History     Tobacco Use   Smoking Status Not on file   Smokeless Tobacco Not on file     Family History: unable to obtain secondary to dementia    Meds/Allergies   all current active meds have been reviewed  No Known Allergies    Objective   First Vitals:   Blood Pressure: 108/66 (10/05/23 0929)  Pulse: 75 (10/05/23 0929)  Temperature: 97.6 °F (36.4 °C) (10/05/23 0941)  Temp Source: Rectal (10/05/23 0941)  Respirations: 20 (10/05/23 0929)  Height: 5' 5" (165.1 cm) (10/05/23 0958)  Weight - Scale: 96 kg (211 lb 10.3 oz) (10/05/23 0929)  SpO2: 92 % (10/05/23 0929)    Current Vitals:   Blood Pressure: 120/77 (10/05/23 1215)  Pulse: 71 (10/05/23 1215)  Temperature: 97.6 °F (36.4 °C) (10/05/23 0941)  Temp Source: Rectal (10/05/23 0941)  Respirations: (!) 28 (10/05/23 1215)  Height: 5' 5" (165.1 cm) (10/05/23 0958)  Weight - Scale: 96 kg (211 lb 10.3 oz) (10/05/23 0929)  SpO2: 92 % (10/05/23 1200)      Intake/Output Summary (Last 24 hours) at 10/5/2023 1233  Last data filed at 10/5/2023 1037  Gross per 24 hour   Intake 1050 ml   Output --   Net 1050 ml       Invasive Devices     Peripheral Intravenous Line  Duration           Peripheral IV 10/05/23 Left Antecubital <1 day                Physical Exam  Vitals and nursing note reviewed. Constitutional:       Appearance: He is obese.  He is ill-appearing. HENT:      Head: Normocephalic and atraumatic. Mouth/Throat:      Mouth: Mucous membranes are dry. Eyes:      General: No scleral icterus. Cardiovascular:      Rate and Rhythm: Normal rate. Pulmonary:      Breath sounds: No wheezing or rales. Comments: Decreased breath sounds at B/L bases with tachypnea noted. Pulse ox 85% on RA, when 2.5 liters NC applied increased to 93%  Abdominal:      General: There is distension. Palpations: Abdomen is soft. There is no mass. Tenderness: There is abdominal tenderness. There is no guarding or rebound. Hernia: No hernia is present. Comments: Soft, distended abd, with tenderness throughout but most tender near umbilicus and RUQ. Hypoactive BS. No obvious previous abdominal incisions. Skin:     General: Skin is warm and dry. Coloration: Skin is not jaundiced. Neurological:      Mental Status: He is alert. He is disoriented. Comments: Oriented to self and time, not place    Moving everything equally         Lab Results:   I have personally reviewed pertinent lab results.   , CBC:   Lab Results   Component Value Date    WBC 19.64 (H) 10/05/2023    HGB 13.6 10/05/2023    HCT 42.2 10/05/2023    MCV 89 10/05/2023     10/05/2023    RBC 4.77 10/05/2023    MCH 28.5 10/05/2023    MCHC 32.2 10/05/2023    RDW 16.6 (H) 10/05/2023    MPV 10.5 10/05/2023    NRBC 0 10/05/2023   , CMP:   Lab Results   Component Value Date    SODIUM 140 10/05/2023    K 3.8 10/05/2023     10/05/2023    CO2 25 10/05/2023    BUN 60 (H) 10/05/2023    CREATININE 3.57 (H) 10/05/2023    CALCIUM 8.6 10/05/2023    AST 32 10/05/2023    ALT 17 10/05/2023    ALKPHOS 89 10/05/2023    EGFR 17 10/05/2023   , Coagulation:   Lab Results   Component Value Date    INR 1.21 (H) 10/05/2023   , Urinalysis:   Lab Results   Component Value Date    COLORU Yellow 10/05/2023    CLARITYU Cloudy 10/05/2023    SPECGRAV >=1.030 10/05/2023    PHUR 5.0 10/05/2023 LEUKOCYTESUR Moderate (A) 10/05/2023    NITRITE Negative 10/05/2023    GLUCOSEU Negative 10/05/2023    KETONESU Trace (A) 10/05/2023    BILIRUBINUR Moderate (A) 10/05/2023    BLOODU Large (A) 10/05/2023   blood cultures pending  Trop WNL  Lactic acid WNL  procal 2.98  Flu/covid/rsv neg    Imaging: I have personally reviewed pertinent reports. EKG, Pathology, and Other Studies: I have personally reviewed pertinent reports. Code Status: No Order  Advance Directive and Living Will:      Power of :    POLST:      Counseling / Coordination of Care  Total floor / unit time spent today 30 minutes. Greater than 50% of total time was spent with the patient and / or family counseling and / or coordination of care. A description of the counseling / coordination of care: patient hx and plan of care discussed with ED provider and patient was educated on possible plan of care.

## 2023-10-05 NOTE — PLAN OF CARE
Problem: INFECTION - ADULT  Goal: Absence or prevention of progression during hospitalization  Description: INTERVENTIONS:  - Assess and monitor for signs and symptoms of infection fever tachycardia  - Monitor lab/diagnostic results  - Monitor all insertion sites,   - Monitor endotracheal if appropriate and nasal secretions for changes in amount and color  - [place] appropriate cooling/warming therapies per order  - Administer medications as ordered  - Instruct and encourage patient and family to use good hand hygiene technique  - Identify and instruct in appropriate isolation precautions for identified infection/condition  Outcome: Progressing

## 2023-10-05 NOTE — H&P
[facility]  H&P  Name: Mendy Carpio 61 y.o. male I MRN: [id number]  Unit/Bed#: -01 I Date of Admission: 10/5/2023   Date of Service: 10/5/2023 I Hospital Day: 0      Assessment/Plan   * Severe sepsis (HCC)  Assessment & Plan  · Met criteria by leukocytosis with tachypnea source of infection is acute cystitis with a suspected acute cholecystitis no evidence of pneumonia as CT chest did not reveal any abnormalities or consolidation in the chest.  We will continue Isolyte at 125 mill per hour. · Lactic acid is negative but he does have altered mental status with a creatinine of greater than 2  · Blood cultures trend procalcitonin consult infectious disease  · Work-up for the above in progress as well start Zosyn renally dosed    HTN (hypertension)  Assessment & Plan  · Blood pressure is controlled he is on labetalol 200 mg twice a day we will hold secondary to n.p.o. status we will place just Lopressor as needed if SBP is greater than 160    Neurosyphilis  Assessment & Plan  · Does have a tendency of aggressive behavior he was on Depakote for the psychosis he is also on Seroquel hold both in light of above    Dementia [facility])  Assessment & Plan  · Does have a tendency towards behavioral disturbance he is on Seroquel over there he is also on Namenda unfortunately he is n.p.o. secondary to SBO we will not start till able to take p.o. we will place naproxen as needed for agitation. Cardiomegaly  Assessment & Plan  · Severe cardiomegaly evident on the imaging there is no evidence of any fluid overload we will proceed with a 2D echo as I do not see any work-up in the previous records    Prolonged QT interval  Assessment & Plan  · qtc level is 490 to avoid their QT prolongation drugs.   Repeat an EKG tomorrow    SBO (small bowel obstruction) (HCC)  Assessment & Plan  · His enteritis in the CT abdomen and pelvis evaluated by surgery n.p.o. IV fluids if develops nausea vomiting we will proceed with NG tube. He does have bowel sounds but he does is distended. Tigan as needed as QTc is 492    Acute cholecystitis  Assessment & Plan  · Evident on CT he does have tenderness in the right upper quadrant but LFTs are normal discussed with surgery proceed with ultrasound may need a HIDA. Proceed with antibiotics. Hyperammonemia (HCC)  Assessment & Plan  · Suspect in relation to Depakote as he has no history of liver cirrhosis. Start L-carnitine 100 mg/kg IV over 30 minutes followed by 50 mg/kg every 8 hours till see improvement. His ammonia level is 99 check a total Depakote level hold Depakote for now. We will have to consult psychiatry in terms of the Depakote adjustment as this is formodd disorder, once taking po    Acute kidney injury [facility])  Assessment & Plan  · Secondary to severe sepsis prerenal although there is no obstructive uropathy on the CT abdomen and pelvis bladder scan done 138 we will continue to monitor hydrate 125 mill per hour. The usual creatinine is baseline at 1 consult nephrology. Acute metabolic encephalopathy  Assessment & Plan  · Usual baseline is he is awake and oriented to self only he does get combative he does groan. Patient has history of dementia , neurosyphilis and CVA currently he is lethargic but able to state his name. Is multifactorial and severe sepsis with infectious etiology CT of the brain is negative for acute pathology. Also has hyperammonemia and suspect secondary to Depakote as he has no history of liver cirrhosis. · We will do treatment for above. Also check a TSH. Check a Depakote level       VTE Pharmacologic Prophylaxis: VTE Score: 4 Moderate Risk (Score 3-4) - Pharmacological DVT Prophylaxis Ordered: heparin.   Code Status: Level 3 - DNAR and DNI   Discussion with family: Patient will speak to family    Anticipated Length of Stay: Patient will be admitted on an inpatient basis with an anticipated length of stay of greater than 2 midnights secondary to Severe sepsis hyperammonemia UTI obstruction TERE. Total Time Spent on Date of Encounter in care of patient: >45 mins. This time was spent on one or more of the following: performing physical exam; counseling and coordination of care; obtaining or reviewing history; documenting in the medical record; reviewing/ordering tests, medications or procedures; communicating with other healthcare professionals and discussing with patient's family/caregivers. Chief Complaint: Change in mental status    History of Present Illness:  Paulina Palacio is a 61 y.o. male with a PMH of neurosyphilis CVA and dementia who presents with change in mental status from a nursing home. I am unable to obtain information from the patient himself and I did call nursing home apparently has been falling and confused for couple of days. No issues with dysphagia eating.  -usual baseline he can be combative he groans and he is oriented to self. Obtained records from the ER there was hypoxia pneumonia CT here is negative for any pneumonia    Review of Systems:  Review of Systems   Unable to perform ROS: Mental status change       Past Medical and Surgical History:   Past Medical History:   Diagnosis Date   • Dementia ([address])    • Hypertension    • Neurosyphilis    • Psychiatric disorder    • Stroke [facility])        History reviewed. No pertinent surgical history. Meds/Allergies:  Prior to Admission medications    Not on File     I have reveiwed home medications using records provided by SNF. Allergies: No Known Allergies    Social History:  Marital Status: Single   Substance Use History:   Social History     Substance and Sexual Activity   Alcohol Use Not Currently     Social History     Tobacco Use   Smoking Status Unknown   Smokeless Tobacco Not on file     Social History     Substance and Sexual Activity   Drug Use Not Currently       Family History:  History reviewed. No pertinent family history.     Physical Exam: Vitals:   Blood Pressure: 120/83 (10/05/23 1517)  Pulse: 74 (10/05/23 1517)  Temperature: 97.9 °F (36.6 °C) (10/05/23 1517)  Temp Source: Rectal (10/05/23 0941)  Respirations: 20 (10/05/23 1517)  Height: 5' 5" (165.1 cm) (10/05/23 0958)  Weight - Scale: 96 kg (211 lb 10.3 oz) (10/05/23 0929)  SpO2: 96 % (10/05/23 1517)    Physical Exam  Vitals and nursing note reviewed. Constitutional:       General: He is not in acute distress. Appearance: He is well-developed. He is ill-appearing. HENT:      Head: Normocephalic and atraumatic. Eyes:      Conjunctiva/sclera: Conjunctivae normal.   Cardiovascular:      Rate and Rhythm: Normal rate and regular rhythm. Heart sounds: No murmur heard. Pulmonary:      Effort: Pulmonary effort is normal. No respiratory distress. Breath sounds: No wheezing or rales. Comments: Poor effort  Abdominal:      General: Bowel sounds are normal. There is distension. Palpations: Abdomen is soft. Tenderness: There is no abdominal tenderness. Musculoskeletal:         General: No swelling. Cervical back: Neck supple. Skin:     General: Skin is warm and dry. Capillary Refill: Capillary refill takes less than 2 seconds.    Neurological:      Comments: Patient is lethargic he is able to tell me his name but that said and falls asleep   Psychiatric:         Mood and Affect: Mood normal.          Additional Data:     Lab Results:  Results from last 7 days   Lab Units 10/05/23  0938   WBC Thousand/uL 19.64*   HEMOGLOBIN g/dL 13.6   HEMATOCRIT % 42.2   PLATELETS Thousands/uL 182   NEUTROS PCT % 78*   LYMPHS PCT % 6*   MONOS PCT % 15*   EOS PCT % 0     Results from last 7 days   Lab Units 10/05/23  0938   SODIUM mmol/L 140   POTASSIUM mmol/L 3.8   CHLORIDE mmol/L 101   CO2 mmol/L 25   BUN mg/dL 60*   CREATININE mg/dL 3.57*   ANION GAP mmol/L 14   CALCIUM mg/dL 8.6   ALBUMIN g/dL 3.3*   TOTAL BILIRUBIN mg/dL 0.56   ALK PHOS U/L 89   ALT U/L 17   AST U/L 32 GLUCOSE RANDOM mg/dL 123     Results from last 7 days   Lab Units 10/05/23  0938   INR  1.21*             Results from last 7 days   Lab Units 10/05/23  0938   LACTIC ACID mmol/L 1.1   PROCALCITONIN ng/ml 2.98*       Lines/Drains:  Invasive Devices     Peripheral Intravenous Line  Duration           Peripheral IV 10/05/23 Left Antecubital <1 day                    Imaging: Reviewed radiology reports from this admission including: chest xray, chest CT scan, abdominal/pelvic CT and CT head  CT head without contrast   Final Result by Junior Bteh MD (10/05 1121)      No acute findings. Workstation performed: [id number]         CT cervical spine without contrast   Final Result by Junior Beth MD (10/05 1124)      No cervical spine fracture or traumatic malalignment. Workstation performed: [id number]         CT chest abdomen pelvis wo contrast   Final Result by Junior Beth MD (10/05 1135)      Cholelithiasis with pericholecystic fluid and mild surrounding fat stranding in keeping with acute cholecystitis. Diffuse proximal and mid small bowel distention without a transition point in keeping with a partial obstruction or enteritis. Fluid throughout the length of the colon which is nondistended is in keeping with a nonspecific diarrheal illness. No acute findings in the chest.      The study was marked in EPIC for immediate notification. Workstation performed: [id number]         XR chest portable   Final Result by Rj Chavez MD (10/05 1050)      1. Findings likely representing cardiomegaly and mild interstitial pulmonary edema, although findings may be accentuated by low lung volumes and portable technique. 2. Bibasilar atelectasis. No consolidation. Workstation performed: [id number]         US right upper quadrant    (Results Pending)       EKG and Other Studies Reviewed on Admission:   · EKG: NSR.  HR 74.    ** Please Note: This note has been constructed using a voice recognition system.  **

## 2023-10-06 ENCOUNTER — APPOINTMENT (INPATIENT)
Dept: CT IMAGING | Facility: HOSPITAL | Age: 63
DRG: 720 | End: 2023-10-06
Payer: COMMERCIAL

## 2023-10-06 ENCOUNTER — APPOINTMENT (INPATIENT)
Dept: MRI IMAGING | Facility: HOSPITAL | Age: 63
DRG: 720 | End: 2023-10-06
Payer: COMMERCIAL

## 2023-10-06 ENCOUNTER — APPOINTMENT (INPATIENT)
Dept: NON INVASIVE DIAGNOSTICS | Facility: HOSPITAL | Age: 63
DRG: 720 | End: 2023-10-06
Payer: COMMERCIAL

## 2023-10-06 VITALS
TEMPERATURE: 97.7 F | WEIGHT: 211 LBS | BODY MASS INDEX: 35.16 KG/M2 | HEIGHT: 65 IN | HEART RATE: 99 BPM | OXYGEN SATURATION: 92 % | RESPIRATION RATE: 22 BRPM | SYSTOLIC BLOOD PRESSURE: 175 MMHG | DIASTOLIC BLOOD PRESSURE: 117 MMHG

## 2023-10-06 PROBLEM — R56.9 SEIZURE (HCC): Status: ACTIVE | Noted: 2023-10-06

## 2023-10-06 LAB
ALBUMIN SERPL BCP-MCNC: 2.7 G/DL (ref 3.5–5)
ALP SERPL-CCNC: 79 U/L (ref 34–104)
ALT SERPL W P-5'-P-CCNC: 11 U/L (ref 7–52)
AMMONIA PLAS-SCNC: 41 UMOL/L (ref 18–72)
ANION GAP SERPL CALCULATED.3IONS-SCNC: 10 MMOL/L
ANISOCYTOSIS BLD QL SMEAR: PRESENT
AORTIC ROOT: 4.1 CM
APICAL FOUR CHAMBER EJECTION FRACTION: 68 %
AST SERPL W P-5'-P-CCNC: 20 U/L (ref 13–39)
ATRIAL RATE: 81 BPM
BASOPHILS # BLD MANUAL: 0 THOUSAND/UL (ref 0–0.1)
BASOPHILS NFR MAR MANUAL: 0 % (ref 0–1)
BILIRUB SERPL-MCNC: 0.37 MG/DL (ref 0.2–1)
BUN SERPL-MCNC: 62 MG/DL (ref 5–25)
CALCIUM ALBUM COR SERPL-MCNC: 8.4 MG/DL (ref 8.3–10.1)
CALCIUM SERPL-MCNC: 7.4 MG/DL (ref 8.4–10.2)
CHLORIDE SERPL-SCNC: 109 MMOL/L (ref 96–108)
CO2 SERPL-SCNC: 23 MMOL/L (ref 21–32)
CREAT SERPL-MCNC: 2.27 MG/DL (ref 0.6–1.3)
CREAT UR-MCNC: 83.4 MG/DL
E WAVE DECELERATION TIME: 192 MS
EOSINOPHIL # BLD MANUAL: 0 THOUSAND/UL (ref 0–0.4)
EOSINOPHIL NFR BLD MANUAL: 0 % (ref 0–6)
ERYTHROCYTE [DISTWIDTH] IN BLOOD BY AUTOMATED COUNT: 16.7 % (ref 11.6–15.1)
FRACTIONAL SHORTENING: 36 % (ref 28–44)
GFR SERPL CREATININE-BSD FRML MDRD: 29 ML/MIN/1.73SQ M
GLUCOSE SERPL-MCNC: 101 MG/DL (ref 65–140)
GLUCOSE SERPL-MCNC: 102 MG/DL (ref 65–140)
HCT VFR BLD AUTO: 36.2 % (ref 36.5–49.3)
HGB BLD-MCNC: 11.6 G/DL (ref 12–17)
INR PPP: 1.13 (ref 0.84–1.19)
INTERVENTRICULAR SEPTUM IN DIASTOLE (PARASTERNAL SHORT AXIS VIEW): 1 CM
INTERVENTRICULAR SEPTUM: 1 CM (ref 0.6–1.1)
LAAS-AP2: 21.5 CM2
LAAS-AP4: 19 CM2
LEFT ATRIUM SIZE: 4.4 CM
LEFT ATRIUM VOLUME (MOD BIPLANE): 56 ML
LEFT INTERNAL DIMENSION IN SYSTOLE: 2.9 CM (ref 2.1–4)
LEFT VENTRICLE DIASTOLIC VOLUME (MOD BIPLANE): 58 ML
LEFT VENTRICLE SYSTOLIC VOLUME (MOD BIPLANE): 22 ML
LEFT VENTRICULAR INTERNAL DIMENSION IN DIASTOLE: 4.5 CM (ref 3.5–6)
LEFT VENTRICULAR POSTERIOR WALL IN END DIASTOLE: 1 CM
LEFT VENTRICULAR STROKE VOLUME: 58 ML
LV EF: 61 %
LVSV (TEICH): 58 ML
LYMPHOCYTES # BLD AUTO: 1.44 THOUSAND/UL (ref 0.6–4.47)
LYMPHOCYTES # BLD AUTO: 8 % (ref 14–44)
MAGNESIUM SERPL-MCNC: 2.6 MG/DL (ref 1.9–2.7)
MCH RBC QN AUTO: 28.4 PG (ref 26.8–34.3)
MCHC RBC AUTO-ENTMCNC: 32 G/DL (ref 31.4–37.4)
MCV RBC AUTO: 89 FL (ref 82–98)
MONOCYTES # BLD AUTO: 2.17 THOUSAND/UL (ref 0–1.22)
MONOCYTES NFR BLD: 12 % (ref 4–12)
MV E'TISSUE VEL-LAT: 12 CM/S
MV E'TISSUE VEL-SEP: 7 CM/S
MV PEAK A VEL: 0.59 M/S
MV PEAK E VEL: 44 CM/S
MV STENOSIS PRESSURE HALF TIME: 56 MS
MV VALVE AREA P 1/2 METHOD: 3.93 CM2
MYELOCYTES NFR BLD MANUAL: 2 % (ref 0–1)
NEUTROPHILS # BLD MANUAL: 14.09 THOUSAND/UL (ref 1.85–7.62)
NEUTS BAND NFR BLD MANUAL: 7 % (ref 0–8)
NEUTS SEG NFR BLD AUTO: 71 % (ref 43–75)
P AXIS: 42 DEGREES
PLATELET # BLD AUTO: 171 THOUSANDS/UL (ref 149–390)
PLATELET BLD QL SMEAR: ADEQUATE
PMV BLD AUTO: 10.3 FL (ref 8.9–12.7)
POTASSIUM SERPL-SCNC: 3.4 MMOL/L (ref 3.5–5.3)
PR INTERVAL: 148 MS
PROCALCITONIN SERPL-MCNC: 1.95 NG/ML
PROT SERPL-MCNC: 6.5 G/DL (ref 6.4–8.4)
PROTHROMBIN TIME: 14.8 SECONDS (ref 11.6–14.5)
QRS AXIS: 16 DEGREES
QRSD INTERVAL: 100 MS
QT INTERVAL: 434 MS
QTC INTERVAL: 504 MS
RA PRESSURE ESTIMATED: 3 MMHG
RBC # BLD AUTO: 4.08 MILLION/UL (ref 3.88–5.62)
RIGHT ATRIUM AREA SYSTOLE A4C: 11.9 CM2
RIGHT VENTRICLE ID DIMENSION: 4.4 CM
SL CV LEFT ATRIUM LENGTH A2C: 6.4 CM
SL CV LV EF: 61
SL CV PED ECHO LEFT VENTRICLE DIASTOLIC VOLUME (MOD BIPLANE) 2D: 92 ML
SL CV PED ECHO LEFT VENTRICLE SYSTOLIC VOLUME (MOD BIPLANE) 2D: 33 ML
SODIUM 24H UR-SCNC: 22 MOL/L
SODIUM SERPL-SCNC: 142 MMOL/L (ref 135–147)
T WAVE AXIS: 87 DEGREES
TRICUSPID ANNULAR PLANE SYSTOLIC EXCURSION: 2.4 CM
TSH SERPL DL<=0.05 MIU/L-ACNC: 0.72 UIU/ML (ref 0.45–4.5)
VENTRICULAR RATE: 81 BPM
WBC # BLD AUTO: 18.06 THOUSAND/UL (ref 4.31–10.16)

## 2023-10-06 PROCEDURE — 85007 BL SMEAR W/DIFF WBC COUNT: CPT | Performed by: FAMILY MEDICINE

## 2023-10-06 PROCEDURE — 82948 REAGENT STRIP/BLOOD GLUCOSE: CPT

## 2023-10-06 PROCEDURE — 83735 ASSAY OF MAGNESIUM: CPT | Performed by: FAMILY MEDICINE

## 2023-10-06 PROCEDURE — NC001 PR NO CHARGE: Performed by: INTERNAL MEDICINE

## 2023-10-06 PROCEDURE — 93306 TTE W/DOPPLER COMPLETE: CPT

## 2023-10-06 PROCEDURE — 85027 COMPLETE CBC AUTOMATED: CPT | Performed by: FAMILY MEDICINE

## 2023-10-06 PROCEDURE — 99233 SBSQ HOSP IP/OBS HIGH 50: CPT | Performed by: FAMILY MEDICINE

## 2023-10-06 PROCEDURE — 85610 PROTHROMBIN TIME: CPT | Performed by: FAMILY MEDICINE

## 2023-10-06 PROCEDURE — 84300 ASSAY OF URINE SODIUM: CPT | Performed by: NURSE PRACTITIONER

## 2023-10-06 PROCEDURE — 70551 MRI BRAIN STEM W/O DYE: CPT

## 2023-10-06 PROCEDURE — 84443 ASSAY THYROID STIM HORMONE: CPT | Performed by: FAMILY MEDICINE

## 2023-10-06 PROCEDURE — 70450 CT HEAD/BRAIN W/O DYE: CPT

## 2023-10-06 PROCEDURE — 84145 PROCALCITONIN (PCT): CPT | Performed by: FAMILY MEDICINE

## 2023-10-06 PROCEDURE — G1004 CDSM NDSC: HCPCS

## 2023-10-06 PROCEDURE — 82570 ASSAY OF URINE CREATININE: CPT | Performed by: NURSE PRACTITIONER

## 2023-10-06 PROCEDURE — 82140 ASSAY OF AMMONIA: CPT | Performed by: FAMILY MEDICINE

## 2023-10-06 PROCEDURE — G0426 INPT/ED TELECONSULT50: HCPCS | Performed by: PSYCHIATRY & NEUROLOGY

## 2023-10-06 PROCEDURE — 99255 IP/OBS CONSLTJ NEW/EST HI 80: CPT | Performed by: NURSE PRACTITIONER

## 2023-10-06 PROCEDURE — 87081 CULTURE SCREEN ONLY: CPT | Performed by: FAMILY MEDICINE

## 2023-10-06 PROCEDURE — 93005 ELECTROCARDIOGRAM TRACING: CPT

## 2023-10-06 PROCEDURE — 80053 COMPREHEN METABOLIC PANEL: CPT | Performed by: FAMILY MEDICINE

## 2023-10-06 RX ORDER — LORAZEPAM 2 MG/ML
1 INJECTION INTRAMUSCULAR ONCE
Status: COMPLETED | OUTPATIENT
Start: 2023-10-06 | End: 2023-10-06

## 2023-10-06 RX ORDER — METRONIDAZOLE 500 MG/100ML
500 INJECTION, SOLUTION INTRAVENOUS EVERY 8 HOURS
Status: DISCONTINUED | OUTPATIENT
Start: 2023-10-06 | End: 2023-10-06

## 2023-10-06 RX ORDER — LORAZEPAM 2 MG/ML
INJECTION INTRAMUSCULAR
Status: DISPENSED
Start: 2023-10-06 | End: 2023-10-06

## 2023-10-06 RX ORDER — LORAZEPAM 2 MG/ML
1 INJECTION INTRAMUSCULAR ONCE
Status: DISCONTINUED | OUTPATIENT
Start: 2023-10-06 | End: 2023-10-06

## 2023-10-06 RX ORDER — POTASSIUM CHLORIDE 14.9 MG/ML
20 INJECTION INTRAVENOUS ONCE
Status: COMPLETED | OUTPATIENT
Start: 2023-10-06 | End: 2023-10-06

## 2023-10-06 RX ORDER — HALOPERIDOL 5 MG/ML
5 INJECTION INTRAMUSCULAR ONCE
Status: COMPLETED | OUTPATIENT
Start: 2023-10-06 | End: 2023-10-06

## 2023-10-06 RX ORDER — CEFTRIAXONE 2 G/50ML
2000 INJECTION, SOLUTION INTRAVENOUS EVERY 24 HOURS
Status: DISCONTINUED | OUTPATIENT
Start: 2023-10-06 | End: 2023-10-06

## 2023-10-06 RX ADMIN — HEPARIN SODIUM 5000 UNITS: 5000 INJECTION INTRAVENOUS; SUBCUTANEOUS at 14:00

## 2023-10-06 RX ADMIN — HALOPERIDOL LACTATE 5 MG: 5 INJECTION, SOLUTION INTRAMUSCULAR at 14:00

## 2023-10-06 RX ADMIN — LORAZEPAM 1 MG: 2 INJECTION INTRAMUSCULAR; INTRAVENOUS at 05:11

## 2023-10-06 RX ADMIN — LEVETIRACETAM 1000 MG: 500 INJECTION, SOLUTION, CONCENTRATE INTRAVENOUS at 06:27

## 2023-10-06 RX ADMIN — PIPERACILLIN AND TAZOBACTAM 3.38 G: 36; 4.5 INJECTION, POWDER, FOR SOLUTION INTRAVENOUS at 11:36

## 2023-10-06 RX ADMIN — HYDROMORPHONE HYDROCHLORIDE 0.5 MG: 1 INJECTION, SOLUTION INTRAMUSCULAR; INTRAVENOUS; SUBCUTANEOUS at 00:16

## 2023-10-06 RX ADMIN — PIPERACILLIN AND TAZOBACTAM 2.25 G: 2; .25 INJECTION, POWDER, LYOPHILIZED, FOR SOLUTION INTRAVENOUS at 21:31

## 2023-10-06 RX ADMIN — OLANZAPINE 5 MG: 10 INJECTION, POWDER, FOR SOLUTION INTRAMUSCULAR at 13:21

## 2023-10-06 RX ADMIN — LEVOCARNITINE 3000 MG: 1 INJECTION, SOLUTION INTRAVENOUS at 00:17

## 2023-10-06 RX ADMIN — PIPERACILLIN AND TAZOBACTAM 2.25 G: 2; .25 INJECTION, POWDER, LYOPHILIZED, FOR SOLUTION INTRAVENOUS at 17:27

## 2023-10-06 RX ADMIN — METRONIDAZOLE 500 MG: 500 INJECTION, SOLUTION INTRAVENOUS at 07:00

## 2023-10-06 RX ADMIN — HEPARIN SODIUM 5000 UNITS: 5000 INJECTION INTRAVENOUS; SUBCUTANEOUS at 21:31

## 2023-10-06 RX ADMIN — HEPARIN SODIUM 5000 UNITS: 5000 INJECTION INTRAVENOUS; SUBCUTANEOUS at 06:44

## 2023-10-06 RX ADMIN — SODIUM CHLORIDE, SODIUM GLUCONATE, SODIUM ACETATE, POTASSIUM CHLORIDE, MAGNESIUM CHLORIDE, SODIUM PHOSPHATE, DIBASIC, AND POTASSIUM PHOSPHATE 125 ML/HR: .53; .5; .37; .037; .03; .012; .00082 INJECTION, SOLUTION INTRAVENOUS at 04:30

## 2023-10-06 RX ADMIN — CEFTRIAXONE 2000 MG: 2 INJECTION, SOLUTION INTRAVENOUS at 06:44

## 2023-10-06 RX ADMIN — LEVETIRACETAM 750 MG: 100 INJECTION, SOLUTION INTRAVENOUS at 19:54

## 2023-10-06 RX ADMIN — SODIUM CHLORIDE, SODIUM GLUCONATE, SODIUM ACETATE, POTASSIUM CHLORIDE, MAGNESIUM CHLORIDE, SODIUM PHOSPHATE, DIBASIC, AND POTASSIUM PHOSPHATE 100 ML/HR: .53; .5; .37; .037; .03; .012; .00082 INJECTION, SOLUTION INTRAVENOUS at 16:50

## 2023-10-06 RX ADMIN — POTASSIUM CHLORIDE 20 MEQ: 14.9 INJECTION, SOLUTION INTRAVENOUS at 11:36

## 2023-10-06 NOTE — UTILIZATION REVIEW
Initial Clinical Review    Admission: Date/Time/Statement:   Admission Orders (From admission, onward)     Ordered        10/05/23 1146  INPATIENT ADMISSION  Once                      Orders Placed This Encounter   Procedures   • INPATIENT ADMISSION     Standing Status:   Standing     Number of Occurrences:   1     Order Specific Question:   Level of Care     Answer:   Med Surg [16]     Order Specific Question:   Estimated length of stay     Answer:   More than 2 Midnights     Order Specific Question:   Certification     Answer:   I certify that inpatient services are medically necessary for this patient for a duration of greater than two midnights. See H&P and MD Progress Notes for additional information about the patient's course of treatment. ED Arrival Information     Expected   -    Arrival   10/5/2023 09:24    Acuity   Emergent            Means of arrival   Ambulance    Escorted by   [facility]    Admission type   Emergency            Arrival complaint   falls, ams, pneumonia, decreased o2 saturation            Chief Complaint   Patient presents with   • Altered Mental Status     Patient brought in by EMS from [facility] for several falls, AMS, pneumonia, elevated WBC and hypoxia. Initial Presentation: 61 y.o. male to ED via EMS from NH  Present to ED with change in mental status from a nursing home. I am unable to obtain information from the patient himself and I did call nursing home apparently has been falling and confused for couple of days. usual baseline he can be combative he groans and he is oriented to self. PMHX neurosyphilis; CVA and dementia   Admitted to [address] [facility] with DX: Severe sepsis   on exam: Tachypnea; Patient is lethargic he is able to tell me his name but that said and falls asleep ; Severe cardiomegaly evident on the imaging there is no evidence of any fluid overload;  tenderness in the right upper quadrant ; Leukocytosis; Cr 3.57 (baseline 1.0); albumin 3.3; Hyperammonemia  CT shows enteritis   PLAN: Cont ivf ; cont iv abx; rec'd Carnitor iv x1; monitor labs; f/u blood cx; ID consulted; NPO; f/u echo; pain control; f/u US abdomen      Date: 10/6/23        Day 2  2/2 complicated uti cute cholecystitis evaluated by infectious disease discussed with them he is going to be transferred for percutaneous cholecystostomy;  US (+) positive for cholecystitis - discussed with surgery he is not a surgical candidate; EF normal just grade 1 diastolic dysfunction; ekg qtc 504 suppl kcl avoid meds repeat in am; ammonia now normal. His encephalopathy is multifactorial so will dc further l- carnitine.  Hold depakote for now   Plan: cont iv abx; cont ivf; rec'd haldol x1; rec'd keppra iv x1 and cont keppra iv; rec'd ativan iv x1; rec'd KCL iv x1; pain control NPO        ED Triage Vitals   Temperature Pulse Respirations Blood Pressure SpO2   10/05/23 0941 10/05/23 0929 10/05/23 0929 10/05/23 0929 10/05/23 0929   97.6 °F (36.4 °C) 75 20 108/66 92 %      Temp Source Heart Rate Source Patient Position - Orthostatic VS BP Location FiO2 (%)   10/05/23 0941 10/05/23 0929 10/05/23 0929 10/05/23 0929 --   Rectal Monitor Lying Left arm       Pain Score       10/05/23 1931       No Pain          Wt Readings from Last 1 Encounters:   10/06/23 95.7 kg (211 lb)     Additional Vital Signs:   Date/Time Temp Pulse Resp BP MAP (mmHg) SpO2 O2 Device Patient Position - Orthostatic VS   10/06/23 1414 97.7 °F (36.5 °C) 88 20 156/93 114 91 % None (Room air) Lying   10/06/23 0745 -- 78 -- 116/78 -- 96 % -- --   10/06/23 07:22:36 97.5 °F (36.4 °C) 80 18 115/78 90 97 % -- --   10/05/23 22:24:58 97.5 °F (36.4 °C) 87 20 140/85 103 98 % -- --   10/05/23 15:17:39 97.9 °F (36.6 °C) 74 20 120/83 95 96 % -- --   10/05/23 12:46:22 97.3 °F (36.3 °C) Abnormal  73 20 120/82 95 93 % -- --   10/05/23 1230 -- 70 30 Abnormal  112/71 86 96 % -- --   10/05/23 1215 -- 71 28 Abnormal  120/77 94 -- -- --   10/05/23 1200 -- 70 31 Abnormal  110/73 87 92 % -- --   10/05/23 1130 -- 70 27 Abnormal  115/74 90 -- -- --   10/05/23 1115 -- 73 32 Abnormal  120/74 91 98 % -- --   10/05/23 1100 -- 70 29 Abnormal  120/72 91 97 % -- --   10/05/23 1030 -- 72 30 Abnormal  117/74 91 94 % -- --   10/05/23 1015 -- 71 30 Abnormal  107/72 84 95 % -- --   10/05/23 1000 -- 72 34 Abnormal  102/69 81 94 % -- --   10/05/23 0957 -- -- -- -- -- -- None (Room air) --   10/05/23 0945 -- 74 34 Abnormal  104/72 85 91 % -- --   10/05/23 0941 97.6 °F (36.4 °C) -- -- -- -- -- -- --   10/05/23 0930 -- 75 25 Abnormal  106/69 83 91 % -- --   10/05/23 0929 -- 75 20 108/66 -- 92 % None (Room air) Lying           EKG: Normal sinus rhythm  Minimal voltage criteria for LVH, may be normal variant ( Luca product )  Prolonged QT  Abnormal ECG  No previous ECGs available    Pertinent Labs/Diagnostic Test Results:   MRI brain wo contrast   Final Result by Jenn Calle MD (10/06 1103)      No acute intracranial abnormality. No mesial temporal sclerosis given motion artifact. Chronic sequela of hemorrhagic infarct in right basal ganglia, chronic hemosiderin deposition in right periventricular white matter adjacent to right thalamus likely sequela of remote injury, chronic multifocal infarcts in bilateral cerebral hemispheres,    and moderate chronic microangiopathy. Scattered chronic microhemorrhages in bilateral cerebral hemispheres, which may be due to combination of hypertensive arteriopathy and cerebral amyloid angiopathy. Probable small right nasal polyp. Recommend direct visualization by ENT for further evaluation. The study was marked in [facility] for immediate notification. Workstation performed: [id number]         CT head wo contrast   Final Result by Marcia Roberson MD (10/06 5733)      No acute intracranial abnormality. Chronic microangiopathic changes.                   Workstation performed: [id number]         US right upper quadrant   Final Result by Bhargav Mancera MD (10/06 0940)      Gallstones and secondary findings of cholecystitis, similar to yesterday's CT. Workstation performed: [id number]         CT head without contrast   Final Result by Soo Jeff MD (10/05 1121)      No acute findings. Workstation performed: [id number]         CT cervical spine without contrast   Final Result by Soo Jeff MD (10/05 1124)      No cervical spine fracture or traumatic malalignment. Workstation performed: [id number]         CT chest abdomen pelvis wo contrast   Final Result by Soo Jeff MD (10/05 1135)      Cholelithiasis with pericholecystic fluid and mild surrounding fat stranding in keeping with acute cholecystitis. Diffuse proximal and mid small bowel distention without a transition point in keeping with a partial obstruction or enteritis. Fluid throughout the length of the colon which is nondistended is in keeping with a nonspecific diarrheal illness. No acute findings in the chest.      The study was marked in [employer] for immediate notification. Workstation performed: [id number]         XR chest portable   Final Result by Brittany Godfrey MD (10/05 1050)      1. Findings likely representing cardiomegaly and mild interstitial pulmonary edema, although findings may be accentuated by low lung volumes and portable technique. 2. Bibasilar atelectasis. No consolidation.          Workstation performed: [id number]           Results from last 7 days   Lab Units 10/05/23  0938   SARS-COV-2  Negative     Results from last 7 days   Lab Units 10/06/23  0509 10/05/23  0938   WBC Thousand/uL 18.06* 19.64*   HEMOGLOBIN g/dL 11.6* 13.6   HEMATOCRIT % 36.2* 42.2   PLATELETS Thousands/uL 171 182   NEUTROS ABS Thousands/µL  --  15.39*   BANDS PCT % 7  --          Results from last 7 days   Lab Units 10/06/23  0509 10/05/23  0938   SODIUM mmol/L 142 140   POTASSIUM mmol/L 3.4* 3.8   CHLORIDE mmol/L 109* 101   CO2 mmol/L 23 25   ANION GAP mmol/L 10 14   BUN mg/dL 62* 60*   CREATININE mg/dL 2.27* 3.57*   EGFR ml/min/1.73sq m 29 17   CALCIUM mg/dL 7.4* 8.6   MAGNESIUM mg/dL 2.6 2.8*     Results from last 7 days   Lab Units 10/06/23  0509 10/05/23  1004 10/05/23  0938   AST U/L 20  --  32   ALT U/L 11  --  17   ALK PHOS U/L 79  --  89   TOTAL PROTEIN g/dL 6.5  --  8.0   ALBUMIN g/dL 2.7*  --  3.3*   TOTAL BILIRUBIN mg/dL 0.37  --  0.56   AMMONIA umol/L 41 99*  --          Results from last 7 days   Lab Units 10/06/23  0509 10/05/23  0938   GLUCOSE RANDOM mg/dL 101 123       Results from last 7 days   Lab Units 10/05/23  0938   PH IZABELLA  7.410*   PCO2 IZABELLA mm Hg 36.2*   PO2 IZABELLA mm Hg 58.6*   HCO3 IZABELLA mmol/L 22.4*   BASE EXC IZABELLA mmol/L -1.7   O2 CONTENT IZABELLA ml/dL 18.1   O2 HGB, VENOUS % 88.2*       Results from last 7 days   Lab Units 10/05/23  1400 10/05/23  1151 10/05/23  0938   HS TNI 0HR ng/L  --   --  28   HS TNI 2HR ng/L  --  25  --    HSTNI D2 ng/L  --  -3  --    HS TNI 4HR ng/L 23  --   --    HSTNI D4 ng/L -5  --   --          Results from last 7 days   Lab Units 10/06/23  0509 10/05/23  0938   PROTIME seconds 14.8* 15.6*   INR  1.13 1.21*   PTT seconds  --  39*     Results from last 7 days   Lab Units 10/06/23  0509   TSH 3RD GENERATON uIU/mL 0.718     Results from last 7 days   Lab Units 10/06/23  0509 10/05/23  0938   PROCALCITONIN ng/ml 1.95* 2.98*     Results from last 7 days   Lab Units 10/05/23  0938   LACTIC ACID mmol/L 1.1       Results from last 7 days   Lab Units 10/05/23  0938   BNP pg/mL 218*       Results from last 7 days   Lab Units 10/05/23  1037   CLARITY UA  Cloudy   COLOR UA  Yellow   SPEC GRAV UA  >=1.030   PH UA  5.0   GLUCOSE UA mg/dl Negative   KETONES UA mg/dl Trace*   BLOOD UA  Large*   PROTEIN UA mg/dl 100 (2+)*   NITRITE UA  Negative   BILIRUBIN UA  Moderate*   UROBILINOGEN UA E.U./dl 4.0*   LEUKOCYTES UA  Moderate*   WBC UA /hpf Innumerable*   RBC UA /hpf Innumerable*   BACTERIA UA /hpf Innumerable*   EPITHELIAL CELLS WET PREP /hpf Occasional     Results from last 7 days   Lab Units 10/05/23  0938   INFLUENZA A PCR  Negative   INFLUENZA B PCR  Negative   RSV PCR  Negative       Results from last 7 days   Lab Units 10/05/23  1037 10/05/23  0946 10/05/23  0938   BLOOD CULTURE   --  Received in Microbiology Lab. Culture in Progress. Received in Microbiology Lab. Culture in Progress. URINE CULTURE  40,000-49,000 cfu/ml Gram Negative Teo Enteric Like*  --   --        ED Treatment:   Medication Administration from 10/05/2023 0924 to 10/05/2023 1242       Date/Time Order Dose Route Action     10/05/2023 0952 EDT cefTRIAXone (ROCEPHIN) IVPB (premix in dextrose) 2,000 mg 50 mL 2,000 mg Intravenous New Bag     10/05/2023 0954 EDT sodium chloride 0.9 % bolus 1,000 mL 1,000 mL Intravenous New Bag     10/05/2023 1157 EDT piperacillin-tazobactam (ZOSYN) 3.375 g in sodium chloride 0.9 % 100 mL IVPB 3.375 g Intravenous New Bag          Admitting Diagnosis: Acute cholecystitis [K81.0]  Confusion [R41.0]  TERE (acute kidney injury) ([address]) [N17.9]  Severe sepsis ([address]) [A41.9, R65.20]  Unspecified multiple injuries, initial encounter [T07. XXXA]  AMS (altered mental status) [R41.82]     Age/Sex: 61 y.o. male     Admission Orders: SCDs; I/O; aspiration precautions; NPO    Scheduled Medications:  heparin (porcine), 5,000 Units, Subcutaneous, Q8H [address]  levETIRAcetam, 750 mg, Intravenous, Q12H  piperacillin-tazobactam, 2.25 g, Intravenous, Q6H    levOCARNitine (CARNITOR) 6,000 mg in sodium chloride 0.9 % 1,000 mL IVPB  Dose: 6,000 mg  Freq: Once Route: IV  Last Dose: Stopped (10/05/23 1750)  Start: 10/05/23 1630 End: 10/05/23 1750    haloperidol lactate (HALDOL) injection 5 mg  Dose: 5 mg  Freq: Once Route: IM  Start: 10/06/23 1400 End: 10/06/23 1400    levETIRAcetam (KEPPRA) 1,000 mg in sodium chloride 0.9 % 100 mL IVPB  Dose: 1,000 mg  Freq:  Once Route: IV  Last Dose: Stopped (10/06/23 0722)  Start: 10/06/23 0545 End: 10/06/23 0722    LORazepam (ATIVAN) injection 1 mg  Dose: 1 mg  Freq: Once Route: IV  Start: 10/06/23 0515 End: 10/06/23 0511    potassium chloride 20 mEq IVPB (premix)  Dose: 20 mEq  Freq: Once Route: IV  Last Dose: 20 mEq (10/06/23 1136)  Start: 10/06/23 0930 End: 10/06/23 1336        Continuous IV Infusions:  multi-electrolyte, 100 mL/hr, Intravenous, Continuous      PRN Meds:  acetaminophen, 325 mg, Rectal, Q4H PRN  HYDROmorphone, 0.5 mg, Intravenous, Q3H PRN  (10/6 rec'd x1 so far today)   labetalol, 5 mg, Intravenous, Q6H PRN  OLANZapine, 5 mg, Intramuscular, Q6H PRN   (10/6 rec'd x1 so far today)  trimethobenzamide, 200 mg, Intramuscular, Q6H PRN  (10/5 rec'd x1)          IP CONSULT TO ACUTE CARE SURGERY  IP CONSULT TO INFECTIOUS DISEASES  IP CONSULT TO NEPHROLOGY  IP CONSULT TO CASE MANAGEMENT  IP CONSULT TO NEUROLOGY    Network Utilization Review Department  ATTENTION: Please call with any questions or concerns to [phone] and carefully listen to the prompts so that you are directed to the right person. All voicemails are confidential.   For Discharge needs, contact Care Management DC Support Team at [phone] opt. 2  Send all requests for admission clinical reviews, approved or denied determinations and any other requests to dedicated fax number below belonging to the campus where the patient is receiving treatment.  List of dedicated fax numbers for the Facilities:  [place] DENIALS (Administrative/Medical Necessity) [phone]   DISCHARGE SUPPORT TEAM (NETWORK) [id number] (Maternity/NICU/Pediatrics) [phone]   [address] [phone]   [facility] [address] [place] [address] [address] [address] [facility] [address] [facility]  [address] [phone]

## 2023-10-06 NOTE — OCCUPATIONAL THERAPY NOTE
Occupational Therapy Cancel Note        Patient Name: John Quintero  [id number] Date: 10/6/2023           10/06/23 1155   Note Type   Note type Evaluation; Cancelled Session       OT order received, chart review completed. Pt admitted to [address] on 10/5 w/ Dx: Severe sepsis. Attempted to see pt for OT session this date, however per Dr. Jerry Valentin, pt is to transfer to another facility for higher level of care. Will cancel OT this date. Should pt were to not transfer, will continue to follow pt and provide care as pt is appropriate and available.       [employer], MS, OTR/L

## 2023-10-06 NOTE — CASE MANAGEMENT
Case Management Discharge Planning Note    Patient name Lila Gil  Location /-13 MRN [id number]  : 1960 Date 10/6/2023       Current Admission Date: 10/5/2023  Current Admission Diagnosis:Severe sepsis [facility])   Patient Active Problem List    Diagnosis Date Noted   • Seizure ([address]) 10/06/2023   • Severe sepsis ([address]) 10/05/2023   • Acute metabolic encephalopathy    • Acute kidney injury ([address]) 10/05/2023   • Hyperammonemia ([address]) 10/05/2023   • Acute cholecystitis 10/05/2023   • SBO (small bowel obstruction) ([address]) 10/05/2023   • Prolonged QT interval 10/05/2023   • Cardiomegaly 10/05/2023   • Dementia ([address]) 10/05/2023   • Neurosyphilis 10/05/2023   • HTN (hypertension) 10/05/2023   • CVA (cerebral vascular accident) ([address]) 10/05/2023      LOS (days): 1  Geometric Mean LOS (GMLOS) (days):   Days to GMLOS:     OBJECTIVE:  Risk of Unplanned Readmission Score: 20.32         Current admission status: Inpatient   Preferred Pharmacy:   [address] [place] [address]  Phone: [phone] Fax: [phone]    Primary Care Provider: Macarena Stanley MD    Primary Insurance: [facility]  Secondary Insurance:     DISCHARGE DETAILS:        CM received LINK [place], with Debit Car Number for 1/2 cost of patients transport. CM completed Medical Necessity for transport to higher level of care.

## 2023-10-06 NOTE — NURSING NOTE
Upon entering room to speak with PCA patient noted to have left sided facial droop and garbled speech. Unable to understand anything patient is saying. Rapid response called at this time to rule out stroke. Hospitalist practitioner and ICU practitioner at bedside. Patient assessed to be unable to unclench jaw and have a shaky left arm. Patient given IV Ativan during rapid and transported to CT scan.

## 2023-10-06 NOTE — ASSESSMENT & PLAN NOTE
· Evident on CT he does have tenderness in the right upper quadrant but LFTs are normal discussed with [facility] ultrasound was positive for cholecystitis as well.   And that is where his sepsis is coming from discussed with surgery he is not a surgical candidate and he would need percutaneous cholecystostomy hence I do not have IR here today not till Monday and family anyhow prefers to to have patient transferred closer to them at [facility] - accepted by surgery service Dr Vickie Espinosa   · Awaiting for bed transport  · Keep npo   · Pain control   · Iv abx   · Iv fluids

## 2023-10-06 NOTE — EMTALA/ACUTE CARE TRANSFER
[facility]  [address]  [facility] [address]  Dept: [phone]      ACUTE CARE TRANSFER CONSENT    NAME Princess Live                                         1960                              MRN [id number]    I have been informed of my rights regarding examination, treatment, and transfer   by Dr. Rock Rosario MD    Benefits:      Risks:        Consent for Transfer:  I acknowledge that my medical condition has been evaluated and explained to me by the treating physician or other qualified medical person and/or my attending physician, who has recommended that I be transferred to the service of    at  . The above potential benefits of such transfer, the potential risks associated with such transfer, and the probable risks of not being transferred have been explained to me, and I fully understand them. The doctor has explained that, in my case, the benefits of transfer outweigh the risks. I agree to be transferred. I authorize the performance of emergency medical procedures and treatments upon me in both transit and upon arrival at the receiving facility. Additionally, I authorize the release of any and all medical records to the receiving facility and request they be transported with me, if possible. I understand that the safest mode of transportation during a medical emergency is an ambulance and that the Hospital advocates the use of this mode of transport. Risks of traveling to the receiving facility by car, including absence of medical control, life sustaining equipment, such as oxygen, and medical personnel has been explained to me and I fully understand them. (TONY CORRECT BOX BELOW)  [  ]  I consent to the stated transfer and to be transported by ambulance/helicopter. [  ]  I consent to the stated transfer, but refuse transportation by ambulance and accept full responsibility for my transportation by car.   I understand the risks of non-ambulance transfers and I exonerate the Hospital and its staff from any deterioration in my condition that results from this refusal.    X___________________________________________    DATE  10/06/23  TIME________  Signature of patient or legally responsible individual signing on patient behalf           RELATIONSHIP TO PATIENT_________________________          Provider Certification    NAME Phuc Banks                                         1960                              MRN [id number]    A medical screening exam was performed on the above named patient. Based on the examination:    Condition Necessitating Transfer ir percutraneous choly and family request    Patient Condition:      Reason for Transfer:      Transfer Requirements: Facility     · Space available and qualified personnel available for treatment as acknowledged by    · Agreed to accept transfer and to provide appropriate medical treatment as acknowledged by          · Appropriate medical records of the examination and treatment of the patient are provided at the time of transfer   [address] _______  · Transfer will be performed by qualified personnel from    and appropriate transfer equipment as required, including the use of necessary and appropriate life support measures.     Provider Certification: I have examined the patient and explained the following risks and benefits of being transferred/refusing transfer to the patient/family:         Based on these reasonable risks and benefits to the patient and/or the unborn child(temi), and based upon the information available at the time of the patient’s examination, I certify that the medical benefits reasonably to be expected from the provision of appropriate medical treatments at another medical facility outweigh the increasing risks, if any, to the individual’s medical condition, and in the case of labor to the unborn child, from effecting the transfer.     X____________________________________________ DATE 10/06/23        TIME_______      ORIGINAL - SEND TO MEDICAL RECORDS   COPY - SEND WITH PATIENT DURING TRANSFER No

## 2023-10-06 NOTE — PLAN OF CARE
Problem: Prexisting or High Potential for Compromised Skin Integrity  Goal: Skin integrity is maintained or improved  Description: INTERVENTIONS:  - Identify patients at risk for skin breakdown  - Assess and monitor skin integrity  - Assess and monitor nutrition and hydration status  - Monitor labs   - Assess for incontinence   - Turn and reposition patient  - Assist with mobility/ambulation  - Relieve pressure over bony prominences  - Avoid friction and shearing  - Provide appropriate hygiene as needed including keeping skin clean and dry  - Evaluate need for skin moisturizer/barrier cream  - Collaborate with interdisciplinary team   - Patient/family teaching  - Consider wound care consult   Outcome: Progressing     Problem: MOBILITY - ADULT  Goal: Maintain or return to baseline ADL function  Description: INTERVENTIONS:  -  Assess patient's ability to carry out ADLs; assess patient's baseline for ADL function and identify physical deficits which impact ability to perform ADLs (bathing, care of mouth/teeth, toileting, grooming, dressing, etc.)  - Assess/evaluate cause of self-care deficits   - Assess range of motion  - Assess patient's mobility; develop plan if impaired  - Assess patient's need for assistive devices and provide as appropriate  - Encourage maximum independence but intervene and supervise when necessary  - Involve family in performance of ADLs  - Assess for home care needs following discharge   - Consider OT consult to assist with ADL evaluation and planning for discharge  - Provide patient education as appropriate  Outcome: Progressing  Goal: Maintains/Returns to pre admission functional level  Description: INTERVENTIONS:  - Perform BMAT or MOVE assessment daily.   - Set and communicate daily mobility goal to care team and patient/family/caregiver. - Collaborate with rehabilitation services on mobility goals if consulted  - Perform Range of Motion 3 times a day.   - Reposition patient every 3 hours.  - Dangle patient 3 times a day  - Stand patient 3 times a day  - Ambulate patient 3 times a day  - Out of bed to chair 3 times a day   - Out of bed for meals 3 times a day  - Out of bed for toileting  - Record patient progress and toleration of activity level   Outcome: Progressing     Problem: PAIN - ADULT  Goal: Verbalizes/displays adequate comfort level or baseline comfort level  Description: Interventions:  - Encourage patient to monitor pain and request assistance  - Assess pain using appropriate pain scale  - Administer analgesics based on type and severity of pain and evaluate response  - Implement non-pharmacological measures as appropriate and evaluate response  - Consider cultural and social influences on pain and pain management  - Notify physician/advanced practitioner if interventions unsuccessful or patient reports new pain  Outcome: Progressing     Problem: INFECTION - ADULT  Goal: Absence or prevention of progression during hospitalization  Description: INTERVENTIONS:  - Assess and monitor for signs and symptoms of infection fever tachycardia  - Monitor lab/diagnostic results  - Monitor all insertion sites,   - Monitor endotracheal if appropriate and nasal secretions for changes in amount and color  - [place] appropriate cooling/warming therapies per order  - Administer medications as ordered  - Instruct and encourage patient and family to use good hand hygiene technique  - Identify and instruct in appropriate isolation precautions for identified infection/condition  Outcome: Progressing  Goal: Absence of fever/infection during neutropenic period  Description: INTERVENTIONS:  - Monitor WBC    Outcome: Progressing     Problem: SAFETY ADULT  Goal: Maintain or return to baseline ADL function  Description: INTERVENTIONS:  -  Assess patient's ability to carry out ADLs; assess patient's baseline for ADL function and identify physical deficits which impact ability to perform ADLs (bathing, care of mouth/teeth, toileting, grooming, dressing, etc.)  - Assess/evaluate cause of self-care deficits   - Assess range of motion  - Assess patient's mobility; develop plan if impaired  - Assess patient's need for assistive devices and provide as appropriate  - Encourage maximum independence but intervene and supervise when necessary  - Involve family in performance of ADLs  - Assess for home care needs following discharge   - Consider OT consult to assist with ADL evaluation and planning for discharge  - Provide patient education as appropriate  Outcome: Progressing  Goal: Maintains/Returns to pre admission functional level  Description: INTERVENTIONS:  - Perform BMAT or MOVE assessment daily.   - Set and communicate daily mobility goal to care team and patient/family/caregiver. - Collaborate with rehabilitation services on mobility goals if consulted  - Perform Range of Motion 3 times a day. - Reposition patient every 3 hours.   - Dangle patient 3 times a day  - Stand patient 3 times a day  - Ambulate patient 3 times a day  - Out of bed to chair 3 times a day   - Out of bed for meals 3 times a day  - Out of bed for toileting  - Record patient progress and toleration of activity level   Outcome: Progressing  Goal: Patient will remain free of falls  Description: INTERVENTIONS:  - Educate patient/family on patient safety including physical limitations  - Instruct patient to call for assistance with activity   - Consult OT/PT to assist with strengthening/mobility   - Keep Call bell within reach  - Keep bed low and locked with side rails adjusted as appropriate  - Keep care items and personal belongings within reach  - Initiate and maintain comfort rounds  - Make Fall Risk Sign visible to staff  - Offer Toileting every 3 Hours, in advance of need  - Initiate/Maintain bedalarm  - Obtain necessary fall risk management equipment:   - Apply yellow socks and bracelet for high fall risk patients  - Consider moving patient to room near nurses station  Outcome: Progressing     Problem: DISCHARGE PLANNING  Goal: Discharge to home or other facility with appropriate resources  Description: INTERVENTIONS:  - Identify barriers to discharge w/patient and caregiver  - Arrange for needed discharge resources and transportation as appropriate  - Identify discharge learning needs (meds, wound care, etc.)  - Arrange for interpretive services to assist at discharge as needed  - Refer to Case Management Department for coordinating discharge planning if the patient needs post-hospital services based on physician/advanced practitioner order or complex needs related to functional status, cognitive ability, or social support system  Outcome: Progressing     Problem: Knowledge Deficit  Goal: Patient/family/caregiver demonstrates understanding of disease process, treatment plan, medications, and discharge instructions  Description: Complete learning assessment and assess knowledge base.   Interventions:  - Provide teaching at level of understanding  - Provide teaching via preferred learning methods  Outcome: Progressing

## 2023-10-06 NOTE — ASSESSMENT & PLAN NOTE
· qtc level is 490 to avoid their QT prolongation drugs.   ekg qtc 504 suppl kcl avoid meds repeat in am

## 2023-10-06 NOTE — ASSESSMENT & PLAN NOTE
· Usual baseline is he is awake and oriented to self only he does get combative he does groan. Patient has history of dementia , neurosyphilis and CVA currently he is lethargic but able to state his name. Is multifactorial and severe sepsis with infectious etiology CT of the brain is negative for acute pathology. Also has hyperammonemia and suspect secondary to Depakote as he has no history of liver cirrhosis. · Depakote level was done and was subtherapeutic actually. TSH was normal he is status post L-carnitine infusion his ammonia level is normal today we will discontinue further as his encephalopathy could be multifactorial.  · Last night he stopped talking there was a concern for stroke but he was found to have lockjaw and shaking more suspicious of a seizure he was given Ativan started on Keppra discussed with the neurology today continue Keppra increased to 750 mg IV every 12 hours eventually would need to be transition to Depakote versus Lamictal as Keppra can increase behavioral issues. Patient is being transferred to another hospital follow EEG there MRI done without any acute abnormality just chronic infarcts. · Seizures increased secondary to metabolic derangements and infectious etiology. He did use Depakote for mood stabilizer and not for seizures over the level being subtherapeutic could have prompted that as well secondary to having risk factors as stated above. · Has no lockjaw he has no tremors he is able to wake up and tell me his name and go back to sleep still pretty lethargic. He does get combative at times I did have to give him Haldol 5 mg x 1 as Effexor was not working in the afternoon admits to be placed.

## 2023-10-06 NOTE — ASSESSMENT & PLAN NOTE
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 At this time do not suspect ongoing seizure activity though intermittent subclinical activity cannot be ruled out. Suspect a large component of TME due to multiple infectious sources and metabolic derangements overlying poor baseline cognitive reserve. MRI was unremarkable for any clear acute contributing pathology including stroke however with prior hx of multifocal infarcts, PRES, and severe dementia possible that his current acute illness may have provoked a seizure.  His VPA was held on admission due to hyperammonemia however this may be seen with chronic use of VPA and is not necessarily symptomatic though given his presentation cannot rule this out as a contributor; that said holding his VPA may have further lowered his seizure threshold otherwise.    -continue neurochecks; notify with changes  -HCT reviewed - chronic b/l BG, L thalamic, and b/l corona infarcts noted  -MRI reviewed - no acute ischemia seen however multifocal chronic infarcts noted including hemorrhagic infarct in R BG and multiple microhemorrhages in areas most consistent with hypertensive encephalopathy than amyloid angiopathy though latter cannot be ruled out  -can continue Keppra at this time in lieu of continuing VPA given concern for hyperammonemia and lack of continued seizure activity though depending on clinical course may consider either restarting VPA or utilizing an alternative such as lacosamide or lamotrigine as Keppra itself may cause further mood issues  -increased dose to 750mg BID to better account for weight and his current renal function however would not increase further  -may obtain routine EEG upon transfer with low threshold to obtain video EEG should there be clinical concern for ongoing seizure  -recommend Neurology continue to follow upon transfer to OSH

## 2023-10-06 NOTE — ASSESSMENT & PLAN NOTE
· Severe cardiomegaly evident on the imaging there is no evidence of any fluid overload we will proceed with a 2D echo EF normal just grade 1 diastolic dysfunction

## 2023-10-06 NOTE — PHYSICAL THERAPY NOTE
PHYSICAL THERAPY NOTE          Patient Name: Tedi Boxer [id number]'[id number] Date: 10/6/2023       10/06/23 1140   Note Type   Note type Evaluation; Cancelled Session     Received order for PT consult. Chart reviewed. Pt admitted with diagnosis severe sepsis. Discussed patient's status in interdisciplinary rounds. Per Dr. Jordyn Siddiqui working on transferring patient to higher level of care at this time. No appropriate for therapy services. Will continue to follow and attempt to see patient at a later time should he not transfer.       Melida Puente, PT,DPT

## 2023-10-06 NOTE — PLAN OF CARE
Problem: Prexisting or High Potential for Compromised Skin Integrity  Goal: Skin integrity is maintained or improved  Description: INTERVENTIONS:  - Identify patients at risk for skin breakdown  - Assess and monitor skin integrity  - Assess and monitor nutrition and hydration status  - Monitor labs   - Assess for incontinence   - Turn and reposition patient  - Assist with mobility/ambulation  - Relieve pressure over bony prominences  - Avoid friction and shearing  - Provide appropriate hygiene as needed including keeping skin clean and dry  - Evaluate need for skin moisturizer/barrier cream  - Collaborate with interdisciplinary team   - Patient/family teaching  - Consider wound care consult   10/6/2023 1444 by Hue Paul RN  Outcome: Progressing  10/6/2023 1443 by Hue Paul RN  Outcome: Progressing  10/6/2023 1332 by Hue Paul RN  Outcome: Progressing     Problem: MOBILITY - ADULT  Goal: Maintain or return to baseline ADL function  Description: INTERVENTIONS:  -  Assess patient's ability to carry out ADLs; assess patient's baseline for ADL function and identify physical deficits which impact ability to perform ADLs (bathing, care of mouth/teeth, toileting, grooming, dressing, etc.)  - Assess/evaluate cause of self-care deficits   - Assess range of motion  - Assess patient's mobility; develop plan if impaired  - Assess patient's need for assistive devices and provide as appropriate  - Encourage maximum independence but intervene and supervise when necessary  - Involve family in performance of ADLs  - Assess for home care needs following discharge   - Consider OT consult to assist with ADL evaluation and planning for discharge  - Provide patient education as appropriate  10/6/2023 1444 by Hue Paul RN  Outcome: Progressing  10/6/2023 1443 by Hue Paul RN  Outcome: Progressing  10/6/2023 1332 by Hue Paul RN  Outcome: Progressing  Goal: Maintains/Returns to pre admission functional level  Description: INTERVENTIONS:  - Perform BMAT or MOVE assessment daily.   - Set and communicate daily mobility goal to care team and patient/family/caregiver. - Collaborate with rehabilitation services on mobility goals if consulted  - Perform Range of Motion 3 times a day. - Reposition patient every 3 hours.   - Dangle patient 3 times a day  - Stand patient 3 times a day  - Ambulate patient 3 times a day  - Out of bed to chair 3 times a day   - Out of bed for meals 3 times a day  - Out of bed for toileting  - Record patient progress and toleration of activity level   10/6/2023 1444 by Jerman Cowart RN  Outcome: Progressing  10/6/2023 1443 by Jerman Cowart RN  Outcome: Progressing  10/6/2023 1332 by Jerman Cowart RN  Outcome: Progressing     Problem: PAIN - ADULT  Goal: Verbalizes/displays adequate comfort level or baseline comfort level  Description: Interventions:  - Encourage patient to monitor pain and request assistance  - Assess pain using appropriate pain scale  - Administer analgesics based on type and severity of pain and evaluate response  - Implement non-pharmacological measures as appropriate and evaluate response  - Consider cultural and social influences on pain and pain management  - Notify physician/advanced practitioner if interventions unsuccessful or patient reports new pain  10/6/2023 1444 by Jerman Cowart RN  Outcome: Progressing  10/6/2023 1443 by Jerman Cowart RN  Outcome: Progressing  10/6/2023 1332 by Jerman Cowart RN  Outcome: Progressing     Problem: INFECTION - ADULT  Goal: Absence or prevention of progression during hospitalization  Description: INTERVENTIONS:  - Assess and monitor for signs and symptoms of infection fever tachycardia  - Monitor lab/diagnostic results  - Monitor all insertion sites,   - Monitor endotracheal if appropriate and nasal secretions for changes in amount and color  - [place] appropriate cooling/warming therapies per order  - Administer medications as ordered  - Instruct and encourage patient and family to use good hand hygiene technique  - Identify and instruct in appropriate isolation precautions for identified infection/condition  10/6/2023 1444 by Shannan Mendieta RN  Outcome: Progressing  10/6/2023 1443 by Shannan Mendieta RN  Outcome: Progressing  10/6/2023 1332 by Shannan Mendieta RN  Outcome: Progressing  Goal: Absence of fever/infection during neutropenic period  Description: INTERVENTIONS:  - Monitor WBC    10/6/2023 1444 by Shannan Mendieta RN  Outcome: Progressing  10/6/2023 1443 by Shannan Mendieta RN  Outcome: Progressing  10/6/2023 1332 by Shannan Mendieta RN  Outcome: Progressing     Problem: SAFETY ADULT  Goal: Maintain or return to baseline ADL function  Description: INTERVENTIONS:  -  Assess patient's ability to carry out ADLs; assess patient's baseline for ADL function and identify physical deficits which impact ability to perform ADLs (bathing, care of mouth/teeth, toileting, grooming, dressing, etc.)  - Assess/evaluate cause of self-care deficits   - Assess range of motion  - Assess patient's mobility; develop plan if impaired  - Assess patient's need for assistive devices and provide as appropriate  - Encourage maximum independence but intervene and supervise when necessary  - Involve family in performance of ADLs  - Assess for home care needs following discharge   - Consider OT consult to assist with ADL evaluation and planning for discharge  - Provide patient education as appropriate  10/6/2023 1444 by Shannan Mendieta RN  Outcome: Progressing  10/6/2023 1443 by Shannan Mendieta RN  Outcome: Progressing  10/6/2023 1332 by Shannan Mendieta RN  Outcome: Progressing  Goal: Maintains/Returns to pre admission functional level  Description: INTERVENTIONS:  - Perform BMAT or MOVE assessment daily.   - Set and communicate daily mobility goal to care team and patient/family/caregiver. - Collaborate with rehabilitation services on mobility goals if consulted  - Perform Range of Motion 3 times a day.   - Reposition patient every 3 hours.   - Dangle patient 3 times a day  - Stand patient 3 times a day  - Ambulate patient 3 times a day  - Out of bed to chair 3 times a day   - Out of bed for meals 3 times a day  - Out of bed for toileting  - Record patient progress and toleration of activity level   10/6/2023 1444 by Jaime Culver RN  Outcome: Progressing  10/6/2023 1443 by Jaime Culver RN  Outcome: Progressing  10/6/2023 1332 by Jamie Culver RN  Outcome: Progressing  Goal: Patient will remain free of falls  Description: INTERVENTIONS:  - Educate patient/family on patient safety including physical limitations  - Instruct patient to call for assistance with activity   - Consult OT/PT to assist with strengthening/mobility   - Keep Call bell within reach  - Keep bed low and locked with side rails adjusted as appropriate  - Keep care items and personal belongings within reach  - Initiate and maintain comfort rounds  - Make Fall Risk Sign visible to staff  - Offer Toileting every 3 Hours, in advance of need  - Initiate/Maintain bedalarm  - Obtain necessary fall risk management equipment:   - Apply yellow socks and bracelet for high fall risk patients  - Consider moving patient to room near nurses station  10/6/2023 1444 by Jaime Culver RN  Outcome: Progressing  10/6/2023 1443 by Jaime Culver RN  Outcome: Progressing  10/6/2023 1332 by Jaime Culver RN  Outcome: Progressing     Problem: DISCHARGE PLANNING  Goal: Discharge to home or other facility with appropriate resources  Description: INTERVENTIONS:  - Identify barriers to discharge w/patient and caregiver  - Arrange for needed discharge resources and transportation as appropriate  - Identify discharge learning needs (meds, wound care, etc.)  - Arrange for interpretive services to assist at discharge as needed  - Refer to Case Management Department for coordinating discharge planning if the patient needs post-hospital services based on physician/advanced practitioner order or complex needs related to functional status, cognitive ability, or social support system  10/6/2023 1444 by Jack Mccray RN  Outcome: Progressing  10/6/2023 1443 by Jack Mccray RN  Outcome: Progressing  10/6/2023 1332 by Jack Mccray RN  Outcome: Progressing     Problem: Knowledge Deficit  Goal: Patient/family/caregiver demonstrates understanding of disease process, treatment plan, medications, and discharge instructions  Description: Complete learning assessment and assess knowledge base.   Interventions:  - Provide teaching at level of understanding  - Provide teaching via preferred learning methods  10/6/2023 1444 by Jack Mccray RN  Outcome: Progressing  10/6/2023 1443 by Jack Mccray RN  Outcome: Progressing  10/6/2023 1332 by Jack Mccray RN  Outcome: Progressing     Problem: SAFETY,RESTRAINT: NV/NON-SELF DESTRUCTIVE BEHAVIOR  Goal: Remains free of harm/injury (restraint for non violent/non self-detsructive behavior)  Description: INTERVENTIONS:  - Instruct patient/family regarding restraint use   - Assess and monitor physiologic and psychological status   - Provide interventions and comfort measures to meet assessed patient needs   - Identify and implement measures to help patient regain control  - Assess readiness for release of restraint   10/6/2023 1444 by Jack Mccray RN  Outcome: Progressing  10/6/2023 1443 by Jack Mccray RN  Outcome: Progressing  Goal: Returns to optimal restraint-free functioning  Description: INTERVENTIONS:  - Assess the patient's behavior and symptoms that indicate continued need for restraint  - Identify and implement measures to help patient regain control  - Assess readiness for release of restraint   10/6/2023 1444 by Jack Mccray RN  Outcome: Progressing  10/6/2023 1443 by Jack Mccray RN  Outcome: Progressing     Problem: Nutrition/Hydration-ADULT  Goal: Nutrient/Hydration intake appropriate for improving, restoring or maintaining nutritional needs  Description: Monitor and assess patient's nutrition/hydration status for malnutrition. Collaborate with interdisciplinary team and initiate plan and interventions as ordered. Monitor patient's weight and dietary intake as ordered or per policy. Utilize nutrition screening tool and intervene as necessary. Determine patient's food preferences and provide high-protein, high-caloric foods as appropriate.      INTERVENTIONS:  - Monitor oral intake, urinary output, labs, and treatment plans  - Assess nutrition and hydration status and recommend course of action  - Evaluate amount of meals eaten  - Assist patient with eating if necessary   - Allow adequate time for meals  - Recommend/ encourage appropriate diets, oral nutritional supplements, and vitamin/mineral supplements  - Order, calculate, and assess calorie counts as needed  - Recommend, monitor, and adjust tube feedings and TPN/PPN based on assessed needs  - Assess need for intravenous fluids  - Provide specific nutrition/hydration education as appropriate  - Include patient/family/caregiver in decisions related to nutrition  10/6/2023 1444 by Loreto Gaitan, RN  Outcome: Progressing  10/6/2023 1443 by Loreto Gaitan, RN  Outcome: Progressing

## 2023-10-06 NOTE — PROGRESS NOTES
Progress Note - General Surgery   Deisi Palacios 61 y.o. male MRN: [id number]  Unit/Bed#: -Jenna Encounter: [id number]    Assessment:  -61year old male with  acute cholecystitis    -US RUQ done yesterday positive for cholelithiasis with signs of cholecystitis, no choledocholithiasis. LFTs WNL  -CT scan of chest/abd/pelvis showing severe cardiomegaly, gallstones with pericholecystic fluid and mild surrounding fat stranding suspicious for acute cholecystitis and diffuse proximal to mid small bowel distention without a transition point keeping with a partial obstruction or enteritis. Fluid throughout the length of the colon indicates an associated diarrheal illness.    -leukocytosis WBC 18 (19 )  -hgb 11 (13)  -Creat 2.27 (3.57, 2.13)  -procalcitonin 1.95 (2.98)  -Blood cultures X2 NG at 24 hours    -Urine culture: E. coli    Plan:  -Conservative management of acute cholecystitis, consider IR consult for placement of cholecystostomy tube as patient is a poor surgical candidate. Patient's family requesting transfer to medical facility in [facility] for percutaneous cholecystostomy tube.   -IV antibiotics, Zosyn  -Supportive treatment of suspected SBO vs enteritis   -npo  -IV fluids  -if worsening nausea or vomiting develops consider NGT, but unsure how compliant pt would be with this        Subjective/Objective     Subjective: Patient unable to contribute to history due to dementia    Objective:   Physical Exam:  General: VSS, NAD, pt is lethargic, does respond to voice, appears ill  Head normocephalic, atraumatic  Neck: supple, NT, no masses or JVD   Lungs nl respiratory effort  Heart RRR   Abd soft, +distension, hypoactive BSx4, Pt moans with palpation in the RUQ, no reaction to palpation in LUQ or lower abd, no masses or guarding  Extremities: FAROM with good strength equal bilaterally, no edema  Neuro: A&Ox3, affect appropriate, distal sensation and muscular strength intact      Blood pressure 156/93, pulse 88, temperature 97.7 °F (36.5 °C), temperature source Temporal, resp. rate 20, height 5' 5" (1.651 m), weight 95.7 kg (211 lb), SpO2 91 %. ,Body mass index is 35.11 kg/m².       Intake/Output Summary (Last 24 hours) at 10/6/2023 1703  Last data filed at 10/6/2023 1209  Gross per 24 hour   Intake 2510.42 ml   Output --   Net 2510.42 ml       Invasive Devices     Peripheral Intravenous Line  Duration           Peripheral IV 10/05/23 Left Antecubital 1 day          Drain  Duration           External Urinary Catheter Large <1 day                    Lab, Imaging and other studies:  CBC:   Lab Results   Component Value Date    WBC 18.06 (H) 10/06/2023    HGB 11.6 (L) 10/06/2023    HCT 36.2 (L) 10/06/2023    MCV 89 10/06/2023     10/06/2023    RBC 4.08 10/06/2023    MCH 28.4 10/06/2023    MCHC 32.0 10/06/2023    RDW 16.7 (H) 10/06/2023    MPV 10.3 10/06/2023   , CMP:   Lab Results   Component Value Date    SODIUM 142 10/06/2023    K 3.4 (L) 10/06/2023     (H) 10/06/2023    CO2 23 10/06/2023    BUN 62 (H) 10/06/2023    CREATININE 2.27 (H) 10/06/2023    CALCIUM 7.4 (L) 10/06/2023    AST 20 10/06/2023    ALT 11 10/06/2023    ALKPHOS 79 10/06/2023    EGFR 29 10/06/2023   , Coagulation:   Lab Results   Component Value Date    INR 1.13 10/06/2023   , Urinalysis: No results found for: "COLORU", "CLARITYU", "SPECGRAV", "PHUR", "LEUKOCYTESUR", "NITRITE", "PROTEINUA", "GLUCOSEU", "Janine Sharps", "BILIRUBINUR", "BLOODU", Amylase: No results found for: "AMYLASE", Lipase: No results found for: "LIPASE"

## 2023-10-06 NOTE — UTILIZATION REVIEW
NOTIFICATION OF INPATIENT ADMISSION   AUTHORIZATION REQUEST   SERVICING FACILITY:   [address]  Tax ID: [id number]  NPI: [id number] ATTENDING PROVIDER:  Attending Name and NPI#: Davi Babcock Md [[id number]]  Address: [address] [place] [address]  Phone: [phone]   ADMISSION INFORMATION:  [facility]   Place of Service Code: 21  Inpatient Admission Date/Time: 10/5/23 11:46 AM  Discharge Date/Time: No discharge date for patient encounter. Admitting Diagnosis Code/Description:  Acute cholecystitis [K81.0]  Confusion [R41.0]  TERE (acute kidney injury) ([address]) [N17.9]  Severe sepsis ([address]) [A41.9, R65.20]  Unspecified multiple injuries, initial encounter [T07. [facility]]  AMS (altered mental status) [R41.82]     UTILIZATION REVIEW CONTACT:  Tao Schaefer, Utilization   Network Utilization Review Department  Phone: [phone]  Fax [phone]  Email: [email or web address]  Contact for approvals/pending authorizations, clinical reviews, and discharge. PHYSICIAN ADVISORY SERVICES:  Medical Necessity Denial & [id number]-Rvcl Review  Phone: [phone]  Fax: [phone]  Email: [email or web address]     DISCHARGE SUPPORT TEAM:  For Patients Discharge Needs & Updates  Phone: [phone] opt. 2 Fax: [phone]  Email: [email or web address]

## 2023-10-06 NOTE — ASSESSMENT & PLAN NOTE
· Secondary to severe sepsis prerenal although there is no obstructive uropathy on the CT abdomen and pelvis has improved discussed with nephrology as well continue Isolyte but decrease to 100 mill per hour good urine output

## 2023-10-06 NOTE — CASE MANAGEMENT
Case Management Assessment & Discharge Planning Note    Patient name Quan Alexander  Location /-81 MRN [id number]  : 1960 Date 10/6/2023       Current Admission Date: 10/5/2023  Current Admission Diagnosis:Severe sepsis [facility])   Patient Active Problem List    Diagnosis Date Noted   • Seizure ([address]) 10/06/2023   • Severe sepsis ([address]) 10/05/2023   • Acute metabolic encephalopathy 00/10/1703   • Acute kidney injury ([address]) 10/05/2023   • Hyperammonemia ([address]) 10/05/2023   • Acute cholecystitis 10/05/2023   • SBO (small bowel obstruction) ([address]) 10/05/2023   • Prolonged QT interval 10/05/2023   • Cardiomegaly 10/05/2023   • Dementia ([address]) 10/05/2023   • Neurosyphilis 10/05/2023   • HTN (hypertension) 10/05/2023   • CVA (cerebral vascular accident) ([address]) 10/05/2023      LOS (days): 1  Geometric Mean LOS (GMLOS) (days):   Days to GMLOS:     OBJECTIVE:    Risk of Unplanned Readmission Score: 18.22         Current admission status: Inpatient  Referral Reason:  (Post Acute Placement (specify))    Preferred Pharmacy:   [address] [place] [address]  Phone: [phone] Fax: [phone]    Primary Care Provider: Michel Mead MD    Primary Insurance: [facility]  Secondary Insurance:     ASSESSMENT:  [facility] Proxies    There are no active Health Care Proxies on file. Readmission Root Cause  30 Day Readmission: No    Patient Information  Admitted from[de-identified] Facility  Mental Status: Other (Comment)  During Assessment patient was accompanied by: Not accompanied during assessment  Assessment information provided by[de-identified] Other - please comment (SENTHIL Raman)  Primary Caregiver:  Other (Comment)  Caregiver's Name[de-identified] [facility] staff  Caregiver's Relationship to Patient[de-identified] Family Member  Caregiver's Telephone Number[de-identified] [phone]  Support Systems: Family members  [place] of Residence: [address] [place] [address] do you live in?: [place] entry access options.  Select all that apply.: No steps to enter home  Type of Current Residence: Facility  Upon entering residence, is there a bedroom on the main floor (no further steps)?: Yes  Upon entering residence, is there a bathroom on the main floor (no further steps)?: Yes  In the last 12 months, was there a time when you were not able to pay the mortgage or rent on time?: No  In the last 12 months, how many places have you lived?: 1  In the last 12 months, was there a time when you did not have a steady place to sleep or slept in a shelter (including now)?: No  Homeless/housing insecurity resource given?: N/A  Living Arrangements: Other (Comment)  Is patient a ?: No    Activities of Daily Living Prior to Admission  Functional Status: Assistance  Completes ADLs independently?: No  Level of ADL dependence: Assistance  Ambulates independently?: No  Level of ambulatory dependence: Assistance  Does patient use assisted devices?: Yes  Assisted Devices (DME) used: Janette Sue         Patient Information Continued  Income Source: SSI/SSD  Does patient have prescription coverage?: Yes  Within the past 12 months, you worried that your food would run out before you got the money to buy more.: Never true  Within the past 12 months, the food you bought just didn't last and you didn't have money to get more.: Never true  Food insecurity resource given?: N/A  Does patient receive dialysis treatments?: No         Means of Transportation  Means of Transport to Appts[de-identified] Other (Comment) [facility] SNF staff)  In the past 12 months, has lack of transportation kept you from medical appointments or from getting medications?: No  In the past 12 months, has lack of transportation kept you from meetings, work, or from getting things needed for daily living?: No  Was application for public transport provided?: N/A        DISCHARGE DETAILS:    Discharge planning discussed with[de-identified] [facility] SN[facility] Admission LINK Robin, [address] of Choice: Yes  Comments - Freedom of Choice: return to [facility]  CM contacted family/caregiver?: Yes             Contacts  Patient Contacts: LINK Arias  Relationship to Patient[de-identified] Family  Contact Method: Phone  Phone Number: [phone] Peg Cool  Reason/Outcome: Continuity of Care, Discharge [address]         Is the patient interested in [facility] at discharge?: No    DME Referral Provided  Referral made for DME?: No    Other Referral/Resources/Interventions Provided:  Interventions: Facility Return, SNF  Referral Comments: [facility]    Would you like to participate in our [address] service program?  : No - Declined    Treatment Team Recommendation: Facility Return, SNF  Discharge Destination Plan[de-identified] Facility Return, SNF  Transport at Discharge : BLS Ambulance            0900 CM received confirmation from [facility], [facility], patient is 1 assit for ambulation, can walk 30 feet, is incontinent and dependent for all care needs. Facility is able to take patient back, he has been at facility since 12/8/18. CM received word from MD patient requests higher level of care, IR procedure for percutaneous cholecystomy. Family requests patient be transferred to [facility] in [place], [place]. Family has been trying to get patients SNF changed for sometime and feels if patient is transferred to hospital closer to family they can assist in securing new SNF. MD made arrangements for transfer to [facility] and patient was accepted. Family is required to pay for transport. CM contacted Marlene Munoz, and told her of transportation fee: $2910. CM informed [employer] transportation company requires 1/2 cost up front; [phone]. 56. Kalli Lacy indicated she has cash on hand but does not have credit cards.   Kalli Lacy indicated she was going to go to bank and deposit the money and call CM when complete. Sigrid Triplett would write check for 1/2 transport up front. CM to follow.

## 2023-10-06 NOTE — CONSULTS
CONSULTATION-NEPHROLOGY   Princess Live 61 y.o. male MRN: [id number]  Unit/Bed#: -01 Encounter: [id number]        Assessment and Plan:    1. Acute kidney injury present on admission  · Suspect severe prerenal state prior to admission and kidney function improving with volume expansion. No obstruction on imaging. Analysis significant for ketonuria, 2+ protein, innumerable RBCs/WBCs  · Continue volume expansion balanced salt solution. Renally dose antibiotics. Avoid hypotension, maintain mean arterial pressure greater than 65 mmHg. Given altered mental status, check bladder scan every shift and follow urinary retention protocol. Follow CMP. Check urine chemistries to confirm prerenal state  2. Volume depletion  · Continue volume expansion with balanced salt solution but will reduce rate  3. Hypokalemia  · Give 20 mill equivalents IV potassium  4. Acute metabolic encephalopathy  · Likely multifactorial in nature given seizure-like activity overnight, diagnosis of dementia/neurosyphilis, hyperammonia anemia on admission, metabolic derangements  5. Severe sepsis present on admission  · Being treated for acute cystitis with suspected acute cholecystitis  6. Acute cholecystitis  · Management per general surgery  7. Seizure like activity  · Noted overnight, neurology consultation pending    HPI:    Princess Live is a 61 y.o. male resident of [facility] recently diagnosed with pneumonia at [facility] presented to [facility]'s emergency department 10/5 with chief complaint altered mental status, falls. Initial imaging findings concerning for acute cholecystitis and possible SBO versus enteritis evaluated by general surgery colleagues yesterday. Met criteria for severe sepsis upon arrival and noted to also have acute kidney injury, hyperammonemia. Rapid response overnight for seizure-like activity. Nephrology consult was requested today for acute kidney injury.     Pertinent medical problems include neurosyphilis, dementia, hypertension, seizure disorder    Patient is encephalopathic and HPI obtained from chart    From a nephrology perspective, appears to have baseline creatinine around 1.0-1.1 mg/dL dating back to 2018    Reason for Consult: Acute kidney injury    Review of Systems:  Unable to obtain due to encephalopathy      Historical Information   Past Medical History:   Diagnosis Date   • Dementia ([address])    • Hypertension    • Neurosyphilis    • Psychiatric disorder    • Stroke [facility])      History reviewed. No pertinent surgical history. Social History   Social History     Substance and Sexual Activity   Alcohol Use Not Currently     Social History     Substance and Sexual Activity   Drug Use Not Currently     Social History     Tobacco Use   Smoking Status Unknown   Smokeless Tobacco Not on file       Family History:   History reviewed. No pertinent family history.     Medications:  Pertinent medications were reviewed  Current Facility-Administered Medications   Medication Dose Route Frequency Provider Last Rate   • acetaminophen  325 mg Rectal Q4H PRN Andres Carbone MD     • heparin (porcine)  5,000 Units Subcutaneous [facility] Andres Carbone MD     • HYDROmorphone  0.5 mg Intravenous Q3H PRN JOVI Davila     • labetalol  5 mg Intravenous Q6H PRN Andres Carbone MD     • levETIRAcetam  500 mg Intravenous Q12H Dina Griffin PA-C     • levOCARNitine (CARNITOR) 3,000 mg in sodium chloride 0.9 % 1,000 mL IVPB  3,000 mg Intravenous Q8H Andres Carbone MD 3,000 mg (10/06/23 0017)   • LORazepam          • multi-electrolyte  125 mL/hr Intravenous Continuous Andres Carbone  mL/hr (10/06/23 0430)   • OLANZapine  5 mg Intramuscular Q6H PRN Andres Carbone MD     • piperacillin-tazobactam  3.375 g Intravenous Q6H Andres Carbone MD     • trimethobenzamide  200 mg Intramuscular Q6H PRN Andres Carbone MD           No Known Allergies      Vitals:   /78   Pulse 78 Temp 97.5 °F (36.4 °C)   Resp 18   Ht 5' 5" (1.651 m)   Wt 95.7 kg (211 lb)   SpO2 96%   BMI 35.11 kg/m²   Body mass index is 35.11 kg/m². SpO2: 96 %,   SpO2 Activity: At Rest,   O2 Device: None (Room air)      Intake/Output Summary (Last 24 hours) at 10/6/2023 0910  Last data filed at 10/6/2023 0807  Gross per 24 hour   Intake 3660.42 ml   Output --   Net 3660.42 ml     Invasive Devices     Peripheral Intravenous Line  Duration           Peripheral IV 10/05/23 Left Antecubital <1 day    Peripheral IV 10/06/23 Dorsal (posterior); Left Wrist <1 day          Drain  Duration           External Urinary Catheter Large <1 day                Physical Exam:  General: conscious, cooperative, in no acute distress  Eyes: conjunctivae pink, anicteric sclerae  ENT: lips and mucous membranes moist  Neck: supple, no JVD, no masses  Chest: diminished breath sounds bilaterally, sonorous, on nasal cannula  CVS: S1 & S2, normal rate, regular rhythm  Abdomen: soft, non-tender, softly-distended, round, hypoactive bowel sounds, obese  Extremities: no edema of both legs  Skin: no rash  Neuro: Disoriented, lethargic      Diagnostic Data:  Lab: I have personally reviewed pertinent lab results. ,   CBC:  Results from last 7 days   Lab Units 10/06/23  0509   WBC Thousand/uL 18.06*   HEMOGLOBIN g/dL 11.6*   HEMATOCRIT % 36.2*   PLATELETS Thousands/uL 171      CMP:   Lab Results   Component Value Date    SODIUM 142 10/06/2023    K 3.4 (L) 10/06/2023     (H) 10/06/2023    CO2 23 10/06/2023    BUN 62 (H) 10/06/2023    CREATININE 2.27 (H) 10/06/2023    CALCIUM 7.4 (L) 10/06/2023    AST 20 10/06/2023    ALT 11 10/06/2023    ALKPHOS 79 10/06/2023    EGFR 29 10/06/2023   ,   PT/INR:   Lab Results   Component Value Date    INR 1.13 10/06/2023   ,   Magnesium: No components found for: "MAG",  Phosphorous: No results found for: "PHOS"    Microbiology:  @LABRCNTIP,(urinecx:7)@        JOVI Thakur    Portions of the record may have been created with voice recognition software. Occasional wrong word or "sound a like" substitutions may have occurred due to the inherent limitations of voice recognition software. Read the chart carefully and recognize, using context, where substitutions have occurred.

## 2023-10-06 NOTE — RAPID RESPONSE
Rapid Response Note  Argelia Coyne 61 y.o. male MRN: [id number]  Unit/Bed#: -01 Encounter: [id number]    Rapid Response Notification(s):   Response called date/time:  10/6/2023 5:05 AM  Response team arrival date/time:  10/6/2023 5:06 AM  Response end date/time:  10/6/2023 5:20 AM  Level of care:  [facility]  Rapid response location:  [facility] unit  Primary reason for rapid response call:  Acute change in neuro status    Rapid Response Intervention(s):   Airway:  None  Breathing:  Oxygen  Circulation:  None  Fluids administered:  None  Medications administered:  Lorazepam       Assessment:   · Seizure-like activity with spasticity    Plan:   · Head CT  · Ativan 1 mg IV x1  · Keppra load of 1 g and then 500 twice daily  · Consider neurology consult   · Patient is on Depakote for behavior issues with neurosyphilis is on hold   · Depakote on hold, and Zosyn reduces seizure threshold we will switch him to ceftriaxone and Flagyl       Rapid Response Outcome:   Transfer:  Remain on floor  Code Status: Level 3 (DNAR and DNI)      Family notified of transfer: no  Family member contacted: remains on floor      Background/Situation:   Argelia Coyne is a 61 y.o. male who is admitted to the hospital for worsening confusion and falls found to have concerning evidence of acute cholecystitis possible small bowel obstruction versus enteritis. Patient was started on Zosyn, and oral surgery is following, patient has a history of neurosyphilis with a tendency for positive behavior he is on Depakote and Seroquel, on hold due to acute cholecystitis versus bowel obstruction. Rapid response was called for possible stroke, patient had a clenched jaw and was able to move the right without a problem but the left manually lifted his arm he had shaking and seizure-like activity, appears to be possible seizure-like. Patient usually is on Depakote and Seroquel which is on hold due to small bowel obstruction versus cholecystitis. Started patient on Keppra and he received Ativan 1 mg with resolve of his symptoms    Review of Systems   Unable to perform ROS: Dementia       Objective:   Vitals:    10/06/23 0506 10/06/23 0506 10/06/23 0509 10/06/23 0512   BP:  141/82     BP Location:       Pulse: 82 82 85 81   Resp:       Temp:       TempSrc:       SpO2: 98% 98% 97% 97%   Weight:       Height:         Physical Exam  Vitals and nursing note reviewed. Constitutional:       General: He is awake. Appearance: He is obese. He is ill-appearing. Interventions: Nasal cannula in place. HENT:      Head: Normocephalic and atraumatic. Mouth/Throat:      Comments: Clenched his teeth trying to speak through clenched teeth  Cardiovascular:      Rate and Rhythm: Regular rhythm. Tachycardia present. Pulses: Normal pulses. Pulmonary:      Effort: Pulmonary effort is normal.      Breath sounds: Normal breath sounds. Abdominal:      General: There is distension. Palpations: Abdomen is soft. Musculoskeletal:      Cervical back: Normal range of motion. Right lower leg: Edema present. Left lower leg: Edema present. Comments: Patient was moving the extremities but had a shake and spasticity with it     Skin:     General: Skin is warm and dry. Capillary Refill: Capillary refill takes less than 2 seconds. Coloration: Skin is pale. Neurological:      Mental Status: He is easily aroused. Motor: Tremor present. Coordination: Coordination abnormal.         Portions of the record may have been created with voice recognition software. Occasional wrong word or "sound a like" substitutions may have occurred due to the inherent limitations of voice recognition software. Read the chart carefully and recognize, using context, where substitutions have occurred.     Dina Griffin PA-C

## 2023-10-06 NOTE — PROGRESS NOTES
[address] [facility]  Progress Note  Name: Andree Lujan  MRN: [id number]  Unit/Bed#: -01 I Date of Admission: 10/5/2023   Date of Service: 10/6/2023 I Hospital Day: 1    Assessment/Plan   * Severe sepsis ([address])  Assessment & Plan  · Met criteria by leukocytosis with tachypnea source of infection is acute cystitis with a suspected acute cholecystitis no evidence of pneumonia as CT chest did not reveal any abnormalities or consolidation in the chest. 2/2 complicated uti cute cholecystitis evaluated by infectious disease discussed with them he is going to be transferred for percutaneous cholecystostomy as requested per [facility] of choice. Continue Zosyn    Seizure ([address])  Assessment & Plan  · Suspected last night evaluated by neuro   · See plan under encephalopathy    CVA (cerebral vascular accident) ([address])  Assessment & Plan  · Seems hx of hemorhagic strokes. Mri done multiple strokes chronic evident     HTN (hypertension)  Assessment & Plan  · Blood pressure is controlled he is on labetalol 200 mg twice a day we will hold secondary to n.p.o. status we will place just Lopressor as needed if SBP is greater than 160    Neurosyphilis  Assessment & Plan  · Does have a tendency of aggressive behavior he was on Depakote for the psychosis he is also on Seroquel hold both in light of above    Dementia [facility])  Assessment & Plan  · Does have a tendency towards behavioral disturbance he is on Seroquel over there he is also on Namenda unfortunately he is n.p.o. secondary to SBO we will not start till able to take p.o. we will place naproxen as needed for agitation. Cardiomegaly  Assessment & Plan  · Severe cardiomegaly evident on the imaging there is no evidence of any fluid overload we will proceed with a 2D echo EF normal just grade 1 diastolic dysfunction    Prolonged QT interval  Assessment & Plan  · qtc level is 490 to avoid their QT prolongation drugs.   ekg qtc 504 suppl kcl avoid meds repeat in am     SBO (small bowel obstruction) ([facility])  Assessment & Plan  · His enteritis in the CT abdomen and pelvis evaluated by surgery n.p.o. IV fluids if develops nausea vomiting we will proceed with NG tube. He does have bowel sounds but he does is distended. Tigan as needed as QTc is 492    Acute cholecystitis  Assessment & Plan  · Evident on CT he does have tenderness in the right upper quadrant but LFTs are normal discussed with Moon Cancino ultrasound was positive for cholecystitis as well. And that is where his sepsis is coming from discussed with surgery he is not a surgical candidate and he would need percutaneous cholecystostomy hence I do not have IR here today not till Monday and family anyhow prefers to to have patient transferred closer to them at [facility] - accepted by surgery service Dr Kulwant De Leon   · Awaiting for bed transport  · Keep npo   · Pain control   · Iv abx   · Iv fluids     Hyperammonemia ([address])  Assessment & Plan  · Suspect in relation to Depakote as he has no history of liver cirrhosis. s/p L-carnitine 100 mg/kg IV over 30 minutes followed by 50 mg/kg every 8 hours ammonia now normal. His encephalopathy is multifactorial so will dc further l- carnitine. Hold depakote for now    Acute kidney injury [facility])  Assessment & Plan  · Secondary to severe sepsis prerenal although there is no obstructive uropathy on the CT abdomen and pelvis has improved discussed with nephrology as well continue Isolyte but decrease to 100 mill per hour good urine output    Acute metabolic encephalopathy  Assessment & Plan  · Usual baseline is he is awake and oriented to self only he does get combative he does groan. Patient has history of dementia , neurosyphilis and CVA currently he is lethargic but able to state his name. Is multifactorial and severe sepsis with infectious etiology CT of the brain is negative for acute pathology.   Also has hyperammonemia and suspect secondary to Depakote as he has no history of liver cirrhosis. · Depakote level was done and was subtherapeutic actually. TSH was normal he is status post L-carnitine infusion his ammonia level is normal today we will discontinue further as his encephalopathy could be multifactorial.  · Last night he stopped talking there was a concern for stroke but he was found to have lockjaw and shaking more suspicious of a seizure he was given Ativan started on Keppra discussed with the neurology today continue Keppra increased to 750 mg IV every 12 hours eventually would need to be transition to Depakote versus Lamictal as Keppra can increase behavioral issues. Patient is being transferred to another hospital follow EEG there MRI done without any acute abnormality just chronic infarcts. · Seizures increased secondary to metabolic derangements and infectious etiology. He did use Depakote for mood stabilizer and not for seizures over the level being subtherapeutic could have prompted that as well secondary to having risk factors as stated above. · Has no lockjaw he has no tremors he is able to wake up and tell me his name and go back to sleep still pretty lethargic. He does get combative at times I did have to give him Haldol 5 mg x 1 as Effexor was not working in the afternoon admits to be placed. VTE Pharmacologic Prophylaxis: VTE Score: 4 Moderate Risk (Score 3-4) - Pharmacological DVT Prophylaxis Ordered: heparin. Patient Centered Rounds: I performed bedside rounds with nursing staff today. Discussions with Specialists or Other Care Team Provider: Discussed with neurology infectious disease today and surgery    Education and Discussions with Family / Patient: Updated  (sister) via phone. Total Time Spent on Date of Encounter in care of patient: >45 mins.  This time was spent on one or more of the following: performing physical exam; counseling and coordination of care; obtaining or reviewing history; documenting in the medical record; reviewing/ordering tests, medications or procedures; communicating with other healthcare professionals and discussing with patient's family/caregivers. Current Length of Stay: 1 day(s)  Current Patient Status: Inpatient   Certification Statement: The patient will continue to require additional inpatient hospital stay due to Your sepsis acute cholecystitis complicated UTI seizure awaiting transfer to [facility] hospital  Discharge Plan: Anticipate discharge in 24-48 hrs to discharge location to be determined pending rehab evaluations. Code Status: Level 3 - DNAR and DNI    Subjective:   Seen and examined earlier today in the morning was able to answer his name but went back to sleep. Objective:     Vitals:   Temp (24hrs), Av.7 °F (36.5 °C), Min:97.5 °F (36.4 °C), Max:97.9 °F (36.6 °C)    Temp:  [97.5 °F (36.4 °C)-97.9 °F (36.6 °C)] 97.7 °F (36.5 °C)  HR:  [74-88] 88  Resp:  [18-20] 20  BP: (115-156)/(78-93) 156/93  SpO2:  [91 %-98 %] 91 %  Body mass index is 35.11 kg/m². Input and Output Summary (last 24 hours): Intake/Output Summary (Last 24 hours) at 10/6/2023 1437  Last data filed at 10/6/2023 1209  Gross per 24 hour   Intake 2510.42 ml   Output --   Net 2510.42 ml       Physical Exam:   Physical Exam  Vitals and nursing note reviewed. Constitutional:       General: He is not in acute distress. Appearance: He is well-developed. He is ill-appearing. HENT:      Head: Normocephalic and atraumatic. Eyes:      Conjunctiva/sclera: Conjunctivae normal.   Cardiovascular:      Rate and Rhythm: Normal rate and regular rhythm. Heart sounds: No murmur heard. Pulmonary:      Effort: Pulmonary effort is normal. No respiratory distress. Breath sounds: Normal breath sounds. No wheezing or rales. Abdominal:      General: There is distension. Palpations: Abdomen is soft. Tenderness: There is abdominal tenderness (ruq).       Comments: Decrease bs Musculoskeletal:         General: No swelling. Cervical back: Neck supple. Skin:     General: Skin is warm and dry. Capillary Refill: Capillary refill takes less than 2 seconds. Neurological:      Comments: Lethargic but wakes up answer his name able to follow some commands. No lock jaw or tremors    Psychiatric:         Mood and Affect: Mood normal.          Additional Data:     Labs:  Results from last 7 days   Lab Units 10/06/23  0509 10/05/23  0938   WBC Thousand/uL 18.06* 19.64*   HEMOGLOBIN g/dL 11.6* 13.6   HEMATOCRIT % 36.2* 42.2   PLATELETS Thousands/uL 171 182   BANDS PCT % 7  --    NEUTROS PCT %  --  78*   LYMPHS PCT %  --  6*   LYMPHO PCT % 8*  --    MONOS PCT %  --  15*   MONO PCT % 12  --    EOS PCT % 0 0     Results from last 7 days   Lab Units 10/06/23  0509   SODIUM mmol/L 142   POTASSIUM mmol/L 3.4*   CHLORIDE mmol/L 109*   CO2 mmol/L 23   BUN mg/dL 62*   CREATININE mg/dL 2.27*   ANION GAP mmol/L 10   CALCIUM mg/dL 7.4*   ALBUMIN g/dL 2.7*   TOTAL BILIRUBIN mg/dL 0.37   ALK PHOS U/L 79   ALT U/L 11   AST U/L 20   GLUCOSE RANDOM mg/dL 101     Results from last 7 days   Lab Units 10/06/23  0509   INR  1.13             Results from last 7 days   Lab Units 10/06/23  0509 10/05/23  0938   LACTIC ACID mmol/L  --  1.1   PROCALCITONIN ng/ml 1.95* 2.98*       Lines/Drains:  Invasive Devices     Peripheral Intravenous Line  Duration           Peripheral IV 10/05/23 Left Antecubital 1 day          Drain  Duration           External Urinary Catheter Large <1 day                      Imaging: Reviewed radiology reports from this admission including: MRI brain    Recent Cultures (last 7 days):   Results from last 7 days   Lab Units 10/05/23  1037 10/05/23  0946 10/05/23  0938   BLOOD CULTURE   --  Received in Microbiology Lab. Culture in Progress. Received in Microbiology Lab. Culture in Progress.    URINE CULTURE  40,000-49,000 cfu/ml Gram Negative Teo Enteric Like*  --   --        Last 24 Hours Medication List:   Current Facility-Administered Medications   Medication Dose Route Frequency Provider Last Rate   • acetaminophen  325 mg Rectal Q4H PRN Flores Shell MD     • heparin (porcine)  5,000 Units Subcutaneous [facility] Flores Shell MD     • HYDROmorphone  0.5 mg Intravenous Q3H PRN Lynseydanish Cortes CRNP     • labetalol  5 mg Intravenous Q6H PRN Flores Shell MD     • levETIRAcetam  750 mg Intravenous Q12H Crista Guzman DO     • LORazepam          • multi-electrolyte  100 mL/hr Intravenous Continuous Eugenia Enid, CRNP 100 mL/hr (10/06/23 1136)   • OLANZapine  5 mg Intramuscular Q6H PRN Flores Shell MD     • piperacillin-tazobactam  2.25 g Intravenous Q6H Flores Shell MD     • trimethobenzamide  200 mg Intramuscular Q6H PRN Flores Shell MD          Today, Patient Was Seen By: Flores Shell MD    **Please Note: This note may have been constructed using a voice recognition system. **

## 2023-10-06 NOTE — ASSESSMENT & PLAN NOTE
· Suspect in relation to Depakote as he has no history of liver cirrhosis. s/p L-carnitine 100 mg/kg IV over 30 minutes followed by 50 mg/kg every 8 hours ammonia now normal. His encephalopathy is multifactorial so will dc further l- carnitine.  Hold depakote for now

## 2023-10-06 NOTE — TELEMEDICINE
TeleConsultation - Neurology   Deisi Palacios 61 y.o. male MRN: [id number]  Unit/Bed#: -01 Encounter: [id number]    REQUIRED DOCUMENTATION:     1. This service was provided via Telemedicine. 2. Provider located at [facility]. 3. TeleMed provider: Crista Guzman DO.  4. Identify all parties in room with patient during tele consult:  Scott Rodriguez RN  5. Patient was then informed that this was a Telemedicine visit and that the exam was being conducted confidentially over secure lines. My office door was closed. No one else was in the room. Patient acknowledged consent and understanding of privacy and security of the Telemedicine visit, and gave us permission to have the assistant stay in the room in order to assist with the history and to conduct the exam.  I informed the patient that I have reviewed their record in Epic and presented the opportunity for them to ask any questions regarding the visit today. The patient agreed to participate. 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 At this time do not suspect ongoing seizure activity though intermittent subclinical activity cannot be ruled out. Suspect a large component of TME due to multiple infectious sources and metabolic derangements overlying poor baseline cognitive reserve. MRI was unremarkable for any clear acute contributing pathology including stroke however with prior hx of multifocal infarcts, PRES, and severe dementia possible that his current acute illness may have provoked a seizure.  His VPA was held on admission due to hyperammonemia however this may be seen with chronic use of VPA and is not necessarily symptomatic though given his presentation cannot rule this out as a contributor; that said holding his VPA may have further lowered his seizure threshold otherwise.    -continue neurochecks; notify with changes  -HCT reviewed - chronic b/l BG, L thalamic, and b/l corona infarcts noted  -MRI reviewed - no acute ischemia seen however multifocal chronic infarcts noted including hemorrhagic infarct in R BG and multiple microhemorrhages in areas most consistent with hypertensive encephalopathy than amyloid angiopathy though latter cannot be ruled out  -can continue Keppra at this time in lieu of continuing VPA given concern for hyperammonemia and lack of continued seizure activity though depending on clinical course may consider either restarting VPA or utilizing an alternative such as lacosamide or lamotrigine as Keppra itself may cause further mood issues  -increased dose to 750mg BID to better account for weight and his current renal function however would not increase further  -may obtain routine EEG upon transfer with low threshold to obtain video EEG should there be clinical concern for ongoing seizure  -recommend Neurology continue to follow upon transfer to OSH    Recommendations for outpatient neurological follow up have yet to be determined. Reason for Consult / Principal Problem: seizure  Hx and PE limited by: mental status    HPI: Zac Polk is a 61 y.o. 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 Pt was initially admitted in the afternoon of 10/5 with severe sepsis and evidence of acute cholecystitis, possible enteritis vs SBO, and UTI after having confusion at his nursing home for the past few days. Also complicated by TERE and ammonia found to be 99 on admission, later 41 today.  On VPA 500mg BID chronically however this is reportedly for mood rather than prior seizure. Early this morning he had a rapid response called with concern for facial droop and L sided weakness however on exam pt reportedly had a clenched jaw and shaking of his left side and poor responsiveness concerning for seizure. He was given Ativan with resolution of this per documentation then given 1g of Keppra and started on 500mg BID. Pt is a poor candidate for surgical cholecystectomy and is being transferred for IR consult and percutaneous lesly. Per discussion with nursing this morning, pt has not had any further seizure-like activity however has remained persistently encephalopathic and somnolent. At baseline, pt is reported to only be oriented to self and have behavioral disturbance. In review of records, pt was admitted in November 2018 at an OSH for AMS and found to have multifocal infarcts along with evidence of PRES in the setting of uncontrolled HTN. There was no report of seizure at that time. He did have an LP with a positive RPR however VDRL was negative; he was felt to have late syphilis as opposed to neurosyphilis per ID at that time and received penicillin for treatment. He did also have an EEG at that time that revealed slowing and no epileptiform activity. Inpatient consult to Neurology  Consult performed by: Sergio Pena DO  Consult ordered by: Adrianna Edwards MD         Review of Systems   Unable to perform ROS: Mental status change       Historical Information   Past Medical History:   Diagnosis Date   • Dementia [facility])    • Hypertension    • Neurosyphilis    • Psychiatric disorder    • Stroke [facility])      History reviewed. No pertinent surgical history.   Social History   Social History     Substance and Sexual Activity   Alcohol Use Not Currently     Social History     Substance and Sexual Activity   Drug Use Not Currently     E-Cigarette/Vaping   • E-Cigarette Use Never User      E-Cigarette/Vaping Substances • Nicotine No    • THC No    • CBD No    • Flavoring No    • Other No    • Unknown No      Social History     Tobacco Use   Smoking Status Unknown   Smokeless Tobacco Not on file     Family History: History reviewed. No pertinent family history. Review of previous medical records was completed. Meds/Allergies   all current active meds have been reviewed, current meds:   Current Facility-Administered Medications   Medication Dose Route Frequency   • acetaminophen (TYLENOL) rectal suppository 325 mg  325 mg Rectal Q4H PRN   • heparin (porcine) subcutaneous injection 5,000 Units  5,000 Units Subcutaneous Q8H [address]   • HYDROmorphone (DILAUDID) injection 0.5 mg  0.5 mg Intravenous Q3H PRN   • labetalol (NORMODYNE) injection 5 mg  5 mg Intravenous Q6H PRN   • levETIRAcetam (KEPPRA) 750 mg in sodium chloride 0.9 % 100 mL IVPB  750 mg Intravenous Q12H   • LORazepam (ATIVAN) 2 mg/mL injection **ADS Override Pull**       • multi-electrolyte (PLASMALYTE-A/ISOLYTE-S PH 7.4) IV solution  100 mL/hr Intravenous Continuous   • OLANZapine (ZyPREXA) IM injection 5 mg  5 mg Intramuscular Q6H PRN   • piperacillin-tazobactam (ZOSYN) 3.375 g in sodium chloride 0.9 % 100 mL IVPB  3.375 g Intravenous Q6H   • potassium chloride 20 mEq IVPB (premix)  20 mEq Intravenous Once   • trimethobenzamide (TIGAN) IM injection 200 mg  200 mg Intramuscular Q6H PRN    and PTA meds:   Prior to Admission Medications   Prescriptions Last Dose Informant Patient Reported? Taking?    QUEtiapine (SEROquel XR) 50 mg  Outside Facility (Specify) Yes Yes   Sig: Take 50 mg by mouth 2 (two) times a day Given 1.5 tablet BID for psychosis   Sodium Phosphates (RA Saline Enema) 19-7 GM/118ML ENEM  Outside Facility (Specify) Yes Yes   Sig: Insert 1 Application into the rectum daily as needed (give day 5 no bowel movement)   acetaminophen (TYLENOL) 325 mg tablet  Outside Facility (Specify) Yes Yes   Sig: Take 650 mg by mouth every 4 (four) hours as needed for fever or mild pain (temp over 100.5)   aspirin 81 mg chewable tablet  Outside Facility (Specify) Yes Yes   Sig: Chew 81 mg daily   atorvastatin (LIPITOR) 40 mg tablet  Outside Facility (Specify) Yes Yes   Sig: Take 40 mg by mouth daily after dinner   bisacodyl (DULCOLAX) 5 mg EC tablet   Yes Yes   Sig: Take 5 mg by mouth daily as needed for constipation   bisacodyl (Dulcolax) 10 mg suppository  Outside Facility (Specify) Yes Yes   Sig: Insert 10 mg into the rectum daily as needed for constipation   cimetidine (TAGAMET) 200 mg tablet  Outside Facility (Specify) Yes Yes   Sig: Take 200 mg by mouth daily   divalproex sodium (DEPAKOTE SPRINKLE) 125 MG capsule  Outside Facility (Specify) Yes Yes   Sig: Take 500 mg by mouth every 12 (twelve) hours   labetalol (NORMODYNE) 200 mg tablet  Outside Facility (Specify) Yes Yes   Sig: Take 200 mg by mouth 2 (two) times a day   magnesium hydroxide (MILK OF MAGNESIA) 400 mg/5 mL oral suspension  Outside Facility (Specify) Yes Yes   Sig: Take 30 mL by mouth daily as needed for constipation (if no bowel movement in 3 days for consitpation give HS)   memantine (NAMENDA) 10 mg tablet  Outside Facility (Specify) Yes Yes   Sig: Take 10 mg by mouth 2 (two) times a day   polyethylene glycol (MIRALAX) 17 g packet  Outside Facility (Specify) Yes Yes   Sig: Take 17 g by mouth daily   sodium phosphate 0.05 mmol/mL IV soln (divya/ped)   Yes Yes   Sig: Inject into a catheter in a vein once      Facility-Administered Medications: None       No Known Allergies    Objective   Vitals:Blood pressure 116/78, pulse 78, temperature 97.5 °F (36.4 °C), resp. rate 18, height 5' 5" (1.651 m), weight 95.7 kg (211 lb), SpO2 96 %. ,Body mass index is 35.11 kg/m². Intake/Output Summary (Last 24 hours) at 10/6/2023 1202  Last data filed at 10/6/2023 0807  Gross per 24 hour   Intake 2610.42 ml   Output --   Net 2610.42 ml       Invasive Devices:    Invasive Devices     Peripheral Intravenous Line  Duration Peripheral IV 10/05/23 Left Antecubital 1 day    Peripheral IV 10/06/23 Dorsal (posterior); Left Wrist <1 day          Drain  Duration           External Urinary Catheter Large <1 day                Physical Exam  Constitutional:       Appearance: He is well-developed. HENT:      Head: Normocephalic and atraumatic. Pulmonary:      Effort: No respiratory distress. Abdominal:      General: There is no distension. Musculoskeletal:         General: No swelling. Skin:     Coloration: Skin is not jaundiced. Neurologic Exam     Mental Status   Pt appears obtunded and does not attend to examiner  Eyes closed, does not appear to open to voice or stimulation  Will state first name but not verbalize anything else  Able to stick out tongue on command but nothing else     Cranial Nerves   No gaze preference noted  Possible L facial droop however unclear if this may be baseline     Motor Exam Able to sustain antigravity in both arms for a few seconds before dropping  No antigravity noted in either leg, occasional spontaneous movements in both legs  No clear asymmetry or abnormal movements     Sensory Exam   Withdraws in uppers from noxious stim but no response in both lowers     Gait, Coordination, and Reflexes Unable to perform coordination testing       Lab Results:   I have personally reviewed pertinent reports.   , CBC:   Results from last 7 days   Lab Units 10/06/23  0509 10/05/23  0938   WBC Thousand/uL 18.06* 19.64*   RBC Million/uL 4.08 4.77   HEMOGLOBIN g/dL 11.6* 13.6   HEMATOCRIT % 36.2* 42.2   MCV fL 89 89   PLATELETS Thousands/uL 171 182   , BMP/CMP:   Results from last 7 days   Lab Units 10/06/23  0509 10/05/23  0938   SODIUM mmol/L 142 140   POTASSIUM mmol/L 3.4* 3.8   CHLORIDE mmol/L 109* 101   CO2 mmol/L 23 25   BUN mg/dL 62* 60*   CREATININE mg/dL 2.27* 3.57*   CALCIUM mg/dL 7.4* 8.6   AST U/L 20 32   ALT U/L 11 17   ALK PHOS U/L 79 89   EGFR ml/min/1.73sq m 29 17   , Coagulation:   Results from last 7 days   Lab Units 10/06/23  0509   INR  1.13   , Ammonia:   Results from last 7 days   Lab Units 10/06/23  0509 10/05/23  1004   AMMONIA umol/L 41 99*   , Urinalysis:   Results from last 7 days   Lab Units 10/05/23  1037   COLOR UA  Yellow   CLARITY UA  Cloudy   SPEC GRAV UA  >=1.030   PH UA  5.0   LEUKOCYTES UA  Moderate*   NITRITE UA  Negative   GLUCOSE UA mg/dl Negative   KETONES UA mg/dl Trace*   BILIRUBIN UA  Moderate*   BLOOD UA  Large*     Imaging Studies: I have personally reviewed pertinent films in PACS  EKG, Pathology, and Other Studies: I have personally reviewed pertinent reports.     VTE Prophylaxis: Sequential compression device (Venodyne)     Code Status: Level 3 - DNAR and DNI

## 2023-10-06 NOTE — TRANSPORTATION MEDICAL NECESSITY
Section I - General Information    Name of Patient: Lila Gil                 : 1960    Medicare #: [id number]  Transport Date: 10/06/23 (PCS is valid for round trips on this date and for all repetitive trips in the 60-day range as noted below.)  [facility]                                                         Destination: [facility] [address]  Is the pt's stay covered under Medicare Part A (PPS/DRG)   [x]     Closest appropriate facility? If no, why is transport to more distant facility required? No. If no, explain: Family requests hospital close to them/family preference  If hospice pt, is this transport related to pt's terminal illness? No       Section II - Medical Necessity Questionnaire  Ambulance transportation is medically necessary only if other means of transport are contraindicated or would be potentially harmful to the patient. To meet this requirement, the patient must either be "bed confined" or suffer from a condition such that transport by means other than ambulance is contraindicated by the patient's condition. The following questions must be answered by the medical professional signing below for this form to be valid:    1)  Describe the MEDICAL CONDITION (physical and/or mental) of this patient  [address] that requires the patient to be transported in an ambulance and why transport by other means is contraindicated by the patient's condition: patient requires higher level of care, service not available at [address] : IR procedure for percutaneous cholecystomy    2) Is the patient "bed confined" as defined below? No  To be "be confined" the patient must satisfy all three of the following conditions: (1) unable to get up from bed without Assistance; AND (2) unable to ambulate; AND (3) unable to sit in a chair or wheelchair.     3) Can this patient safely be transported by car or wheelchair van (i.e., seated during transport without a medical attendant or monitoring)? No    4) In addition to completing questions 1-3 above, please check any of the following conditions that apply*:   *Note: supporting documentation for any boxes checked must be maintained in the patient's medical records. If hosp-hosp transfer, describe services needed at 2nd facility not available at 1st facility? Medical attendant required   Unable to tolerate seated position for time needed to transport    Patient with aggressive behavior history      Section III - Signature of Physician or Healthcare Professional  I certify that the above information is true and correct based on my evaluation of this patient, and represent that the patient requires transport by ambulance and that other forms of transport are contraindicated. I understand that this information will be used by the Centers for Medicare and Medicaid Services (CMS) to support the determination of medical necessity for ambulance services, and I represent that I have personal knowledge of the patient's condition at time of transport. []  If this box is checked, I also certify that the patient is physically or mentally incapable of signing the ambulance service's claim and that the institution with which I am affiliated has furnished care, services, or assistance to the patient. My signature below is made on behalf of the patient pursuant to 42 CFR §424.36(b)(4). In accordance with 42 CFR §424.37, the specific reason(s) that the patient is physically or mentally incapable of signing the claim form is as follows:  Mina Espinoza Physician* or Healthcare Professional______________________________________________________________  Signature Date 10/06/23 (For scheduled repetitive transports, this form is not valid for transports performed more than 60 days after this date)    Printed Name & Credentials of Physician or Healthcare Professional (DO AUDELIA, RN, etc.)________________________________  *Form must be signed by patient's attending physician for scheduled, repetitive transports.  For non-repetitive, unscheduled ambulance transports, if unable to obtain the signature of the attending physician, any of the following may sign (choose appropriate option below)  [] Physician Assistant []  Clinical Nurse Specialist []  Registered Nurse  []  Nurse Practitioner  [x] Discharge Planner

## 2023-10-06 NOTE — CONSULTS
Consultation - Infectious Disease   Bucky Moreland 61 y.o. male MRN: [id number]  Unit/Bed#: -01 Encounter: [id number]      Inpatient consult to Infectious Diseases  Consult performed by: Ellie Garcia MD  Consult ordered by: Anjel Rubio MD            REQUIRED DOCUMENTATION:     1. This service was provided via Telemedicine. 2. Provider located at [facility]. 3. TeleMed provider: Ellie Garcia MD.  4. Identify all parties in room with patient during tele consult:RN  5. After connecting through [employer], patient was identified by name and date of birth and assistant checked wristband. Patient was then informed that this was a Telemedicine visit and that the exam was being conducted confidentially over secure lines. My office door was closed. No one else was in the room. Patient acknowledged consent and understanding of privacy and security of the Telemedicine visit, and gave us permission to have the assistant stay in the room in order to assist with the history and to conduct the exam.  I informed the patient that I have reviewed their record in Epic and presented the opportunity for them to ask any questions regarding the visit today. The patient agreed to participate. Assessment/Recommendations     1. Sepsis, present on admission  - Leukocytosis , tachypnea  - Source of sepsis is likely acute cholecystitis and/or complicated UTI. Rule out secondary bacteremia. No evidence of pneumonia on CT chest imaging and no respiratory symptoms/hypoxia   - Hemodynamically stable, afebrile with persistent leukocytosis    · Management as below    2.  Acute calculous cholecystitis  - Diagnosis suspected on CT and sonographic imaging, right upper quadrant tenderness, LFTs are wnl    · Continue pip-tazo, reduce dosing to 2.25 q6h for renal function  · FU blood cx  · Patient is a poor surgical candidate for acute cholecystectomy and recommend IR consult for percutaneous cholecystostomy, please send fluid for gram stain/culture  · Final choice and duration of antibiotics to be determined by cultures and adequacy of source control    3. Possible partial bowel obstruction and/or enteritis  - CT with diffuse proximal and mid small bowel distention with fluid throughout the length of the colon. Passing soft loose stool    · Continue supportive care, management per surgery, serial abdominal exams    4. Possible complicated UTI  - Suspected with pyuria, patient cannot provide any localizing symptoms, has prostatomegaly and small renal stones but no obstructive uropathy    · Antibiotics as above  · FU cx  · Bladder scan per protocol    5. Acute kidney injury   - likely multifactorial in the setting of sepsis, creatinine improving    · Continue renal dosing of abx    6. Acute encephalopathy, possible seizure activity  - in the setting of dementia- previously on depakote and seroquel which are currently being held, hx stroke. No meningeal signs. · Management per primary team, await MRI brain and neurology evaluation  · Penicillins and cephalosporins have been linked to lowering seizure threshold, would prefer zosyn over carbapenem/cefepime if alternative anti-epileptic drugs are effective     7. Hx late latent syphilis, ?neurosyphilis  - Admitted in 2018 with encephalopathy, evidence of stroke. High RPR titers, underwent LP with high CSF protein but other parameters were reportedly normal with negative CSF VDRL, treated with 3 doses of bicillin, fu details not available and if patient was ever empirically treated with iv penicillin    · Recommend outpatient fu with neurology and ID after fu details available. Discussed in detail with the primary service. History     Reason for Consult: Cholecystitis  HPI: Baldemar Hernandez is a 61y.o. year old male with hx syphilis, stroke and dementia. He's a long term resident of a nursing facility.   History is limited from the patient and is obtained mainly from the medical records. The patient presented to the ER on 10/6 with several falls over the past few days, increasing confusion and new oxygen requirement. Labs at the SNF were concerning for acutely elevated creatinine of 2.13, white count of 12.7 and CXR was reportedly concerning for pneumonia prompting transfer to the ER. CT imaging here is concerning for acute cholecystitis. He is currently on zosyn   No reports of fevers, chills, or sweats, nausea, vomiting, or diarrhea. Infectious disease is being consulted for diagnostic work up and antibiotic management. Review of Systems  Pertinent positives and negatives as noted in HPI. Rest complete 12 point system-based review of systems is limited due to dementia. PAST MEDICAL HISTORY:  Past Medical History:   Diagnosis Date   • Dementia ([address])    • Hypertension    • Neurosyphilis    • Psychiatric disorder    • Stroke [facility])      History reviewed. No pertinent surgical history. FAMILY HISTORY:  Non-contributory    SOCIAL HISTORY:  Social History   Single  Social History     Substance and Sexual Activity   Alcohol Use Not Currently     Social History     Substance and Sexual Activity   Drug Use Not Currently     Social History     Tobacco Use   Smoking Status Unknown   Smokeless Tobacco Not on file       ALLERGIES:  No Known Allergies    MEDICATIONS:  All current active medications have been reviewed.     Physical Exam     Temp:  [97.3 °F (36.3 °C)-97.9 °F (36.6 °C)] 97.5 °F (36.4 °C)  HR:  [70-87] 78  Resp:  [18-34] 18  BP: (102-141)/(66-85) 116/78  SpO2:  [91 %-98 %] 96 %  Temp (24hrs), Av.6 °F (36.4 °C), Min:97.3 °F (36.3 °C), Max:97.9 °F (36.6 °C)  Current: Temperature: 97.5 °F (36.4 °C)    Intake/Output Summary (Last 24 hours) at 10/6/2023 0906  Last data filed at 10/6/2023 4365  Gross per 24 hour   Intake 3660.42 ml   Output --   Net 3660.42 ml         Physical exam findings reported by bedside and primary medical team staff    General Appearance:  Appearing ill, nontoxic, and in some distress, appears stated age   Head:  Normocephalic, without obvious abnormality, atraumatic   Eyes:  PERRL, conjunctiva pink and sclera anicteric, both eyes   Nose: Nares normal, mucosa normal, no drainage   Throat: Oropharynx moist without lesions; lips, mucosa, and tongue normal; teeth and gums normal   Neck: Supple, symmetrical, trachea midline, no adenopathy, no tenderness/mass/nodules   Back:   Symmetric, no curvature, ROM normal, no CVA tenderness   Lungs:   Diminished to auscultation bilaterally, no audible wheezes, rhonchi and rales, respirations unlabored   Chest Wall:  No tenderness or deformity   Heart:  Regular rate and rhythm, S1, S2 normal, no murmur, rub or gallop   Abdomen:   Soft, ruq tenderness, distended, hypoactive bowel sounds, no masses, no organomegaly    No CVA tenderness   Extremities: Extremities normal, atraumatic, no cyanosis, clubbing or edema   Skin: Skin color, texture, turgor normal, no rashes or lesions. No draining wounds noted. Lymph nodes: Cervical, supraclavicular, and axillary nodes normal   Neurologic: Confused, impulsive, not oriented x 3       Invasive Devices:   Peripheral IV 10/05/23 Left Antecubital (Active)   Site Assessment WDL 10/05/23 1931   Dressing Type Transparent 10/05/23 1931   Line Status Infusing 10/05/23 1931   Dressing Status Clean;Dry; Intact 10/05/23 1931   Dressing Change Due 10/09/23 10/05/23 1931   Reason Not Rotated Not due 10/05/23 1931       Peripheral IV 10/06/23 Dorsal (posterior); Left Wrist (Active)       External Urinary Catheter Large (Active)       Labs, Imaging, & Other Studies     Lab Results:    I have personally reviewed pertinent labs.     Results from last 7 days   Lab Units 10/06/23  0509 10/05/23  0938   WBC Thousand/uL 18.06* 19.64*   HEMOGLOBIN g/dL 11.6* 13.6   PLATELETS Thousands/uL 171 182     Results from last 7 days   Lab Units 10/06/23  0509 10/05/23  0938   POTASSIUM mmol/L 3.4* 3.8   CHLORIDE mmol/L 109* 101   CO2 mmol/L 23 25   BUN mg/dL 62* 60*   CREATININE mg/dL 2.27* 3.57*   EGFR ml/min/1.73sq m 29 17   CALCIUM mg/dL 7.4* 8.6   AST U/L 20 32   ALT U/L 11 17   ALK PHOS U/L 79 89     Results from last 7 days   Lab Units 10/05/23  0946 10/05/23  0938   BLOOD CULTURE  Received in Microbiology Lab. Culture in Progress. Received in Microbiology Lab. Culture in Progress. Imaging Studies:   I have personally reviewed pertinent imaging study reports and images in PACS. EKG, Pathology, and Other Studies:   I have personally reviewed pertinent reports and reviewed external records. Counseling/Coordination of care: Total 90 minutes communication with the patient via telehealth. Labs, medical tests and imaging studies were independently and extensively reviewed by me as noted above in HPI and old records were obtained and summarized as noted above in HPI. My recommendations were discussed with the patient in detail who verbalized understanding.

## 2023-10-06 NOTE — PLAN OF CARE
Problem: Prexisting or High Potential for Compromised Skin Integrity  Goal: Skin integrity is maintained or improved  Description: INTERVENTIONS:  - Identify patients at risk for skin breakdown  - Assess and monitor skin integrity  - Assess and monitor nutrition and hydration status  - Monitor labs   - Assess for incontinence   - Turn and reposition patient  - Assist with mobility/ambulation  - Relieve pressure over bony prominences  - Avoid friction and shearing  - Provide appropriate hygiene as needed including keeping skin clean and dry  - Evaluate need for skin moisturizer/barrier cream  - Collaborate with interdisciplinary team   - Patient/family teaching  - Consider wound care consult   Outcome: Progressing     Problem: MOBILITY - ADULT  Goal: Maintain or return to baseline ADL function  Description: INTERVENTIONS:  -  Assess patient's ability to carry out ADLs; assess patient's baseline for ADL function and identify physical deficits which impact ability to perform ADLs (bathing, care of mouth/teeth, toileting, grooming, dressing, etc.)  - Assess/evaluate cause of self-care deficits   - Assess range of motion  - Assess patient's mobility; develop plan if impaired  - Assess patient's need for assistive devices and provide as appropriate  - Encourage maximum independence but intervene and supervise when necessary  - Involve family in performance of ADLs  - Assess for home care needs following discharge   - Consider OT consult to assist with ADL evaluation and planning for discharge  - Provide patient education as appropriate  Outcome: Progressing  Goal: Maintains/Returns to pre admission functional level  Description: INTERVENTIONS:  - Perform BMAT or MOVE assessment daily.   - Set and communicate daily mobility goal to care team and patient/family/caregiver. - Collaborate with rehabilitation services on mobility goals if consulted  - Perform Range of Motion 2 times a day.   - Reposition patient every 2 hours.  - Dangle patient 2 times a day  - Stand patient 2 times a day  - Ambulate patient 2 times a day  - Out of bed to chair 2 times a day   - Out of bed for meals 2 times a day  - Out of bed for toileting  - Record patient progress and toleration of activity level   Outcome: Progressing     Problem: PAIN - ADULT  Goal: Verbalizes/displays adequate comfort level or baseline comfort level  Description: Interventions:  - Encourage patient to monitor pain and request assistance  - Assess pain using appropriate pain scale  - Administer analgesics based on type and severity of pain and evaluate response  - Implement non-pharmacological measures as appropriate and evaluate response  - Consider cultural and social influences on pain and pain management  - Notify physician/advanced practitioner if interventions unsuccessful or patient reports new pain  Outcome: Progressing     Problem: INFECTION - ADULT  Goal: Absence or prevention of progression during hospitalization  Description: INTERVENTIONS:  - Assess and monitor for signs and symptoms of infection fever tachycardia  - Monitor lab/diagnostic results  - Monitor all insertion sites,   - Monitor endotracheal if appropriate and nasal secretions for changes in amount and color  - [place] appropriate cooling/warming therapies per order  - Administer medications as ordered  - Instruct and encourage patient and family to use good hand hygiene technique  - Identify and instruct in appropriate isolation precautions for identified infection/condition  Outcome: Progressing  Goal: Absence of fever/infection during neutropenic period  Description: INTERVENTIONS:  - Monitor WBC    Outcome: Progressing     Problem: SAFETY ADULT  Goal: Maintain or return to baseline ADL function  Description: INTERVENTIONS:  -  Assess patient's ability to carry out ADLs; assess patient's baseline for ADL function and identify physical deficits which impact ability to perform ADLs (bathing, care of mouth/teeth, toileting, grooming, dressing, etc.)  - Assess/evaluate cause of self-care deficits   - Assess range of motion  - Assess patient's mobility; develop plan if impaired  - Assess patient's need for assistive devices and provide as appropriate  - Encourage maximum independence but intervene and supervise when necessary  - Involve family in performance of ADLs  - Assess for home care needs following discharge   - Consider OT consult to assist with ADL evaluation and planning for discharge  - Provide patient education as appropriate  Outcome: Progressing  Goal: Maintains/Returns to pre admission functional level  Description: INTERVENTIONS:  - Perform BMAT or MOVE assessment daily.   - Set and communicate daily mobility goal to care team and patient/family/caregiver. - Collaborate with rehabilitation services on mobility goals if consulted  - Perform Range of Motion 2 times a day. - Reposition patient every 2 hours.   - Dangle patient 2 times a day  - Stand patient 2 times a day  - Ambulate patient 2 times a day  - Out of bed to chair 2 times a day   - Out of bed for meals 2 times a day  - Out of bed for toileting  - Record patient progress and toleration of activity level   Outcome: Progressing  Goal: Patient will remain free of falls  Description: INTERVENTIONS:  - Educate patient/family on patient safety including physical limitations  - Instruct patient to call for assistance with activity   - Consult OT/PT to assist with strengthening/mobility   - Keep Call bell within reach  - Keep bed low and locked with side rails adjusted as appropriate  - Keep care items and personal belongings within reach  - Initiate and maintain comfort rounds  - Make Fall Risk Sign visible to staff  - Offer Toileting every 2 Hours, in advance of need  - Initiate/Maintain bed/chair alarm  - Obtain necessary fall risk management equipment: alarms  - Apply yellow socks and bracelet for high fall risk patients  - Consider moving patient to room near nurses station  Outcome: Progressing     Problem: DISCHARGE PLANNING  Goal: Discharge to home or other facility with appropriate resources  Description: INTERVENTIONS:  - Identify barriers to discharge w/patient and caregiver  - Arrange for needed discharge resources and transportation as appropriate  - Identify discharge learning needs (meds, wound care, etc.)  - Arrange for interpretive services to assist at discharge as needed  - Refer to Case Management Department for coordinating discharge planning if the patient needs post-hospital services based on physician/advanced practitioner order or complex needs related to functional status, cognitive ability, or social support system  Outcome: Progressing     Problem: Knowledge Deficit  Goal: Patient/family/caregiver demonstrates understanding of disease process, treatment plan, medications, and discharge instructions  Description: Complete learning assessment and assess knowledge base.   Interventions:  - Provide teaching at level of understanding  - Provide teaching via preferred learning methods  Outcome: Progressing

## 2023-10-06 NOTE — ASSESSMENT & PLAN NOTE
· Met criteria by leukocytosis with tachypnea source of infection is acute cystitis with a suspected acute cholecystitis no evidence of pneumonia as CT chest did not reveal any abnormalities or consolidation in the chest. 2/2 complicated uti cute cholecystitis evaluated by infectious disease discussed with them he is going to be transferred for percutaneous cholecystostomy as requested per family Hospital of choice.   Continue Zosyn

## 2023-10-07 LAB — MRSA NOSE QL CULT: NORMAL

## 2023-10-07 PROCEDURE — 99239 HOSP IP/OBS DSCHRG MGMT >30: CPT | Performed by: FAMILY MEDICINE

## 2023-10-07 NOTE — DISCHARGE SUMMARY
[address] [facility]  Discharge- Deisi Palacios 1960, 61 y.o. male MRN: [id number]  Unit/Bed#: -Jenna Encounter: [id number]  Primary Care Provider: Tayla Mcmullen MD   Date and time admitted to hospital: 10/5/2023  9:25 AM    * Severe sepsis ([address])  Assessment & Plan  · Met criteria by leukocytosis with tachypnea source of infection is acute cystitis with a suspected acute cholecystitis no evidence of pneumonia as CT chest did not reveal any abnormalities or consolidation in the chest. 2/2 complicated uti cute cholecystitis evaluated by infectious disease discussed with them he is going to be transferred for percutaneous cholecystostomy as requested per [facility] Hospital of choice. Continue Zosyn    Seizure ([address])  Assessment & Plan  · Suspected last night evaluated by neuro   · See plan under encephalopathy    CVA (cerebral vascular accident) ([address])  Assessment & Plan  · Seems hx of hemorhagic strokes. Mri done multiple strokes chronic evident     HTN (hypertension)  Assessment & Plan  · Blood pressure is controlled he is on labetalol 200 mg twice a day we will hold secondary to n.p.o. status we will place just Lopressor as needed if SBP is greater than 160    Neurosyphilis  Assessment & Plan  · Does have a tendency of aggressive behavior he was on Depakote for the psychosis he is also on Seroquel hold both in light of above    Dementia [facility])  Assessment & Plan  · Does have a tendency towards behavioral disturbance he is on Seroquel over there he is also on Namenda unfortunately he is n.p.o. secondary to SBO we will not start till able to take p.o. we will place naproxen as needed for agitation.     Cardiomegaly  Assessment & Plan  · Severe cardiomegaly evident on the imaging there is no evidence of any fluid overload we will proceed with a 2D echo EF normal just grade 1 diastolic dysfunction    Prolonged QT interval  Assessment & Plan  · qtc level is 490 to avoid their QT prolongation drugs.  ekg qtc 504 suppl kcl avoid meds repeat in am     SBO (small bowel obstruction) (HCC)  Assessment & Plan  · His enteritis in the CT abdomen and pelvis evaluated by surgery n.p.o. IV fluids if develops nausea vomiting we will proceed with NG tube. He does have bowel sounds but he does is distended. Tigan as needed as QTc is 492    Acute cholecystitis  Assessment & Plan  · Evident on CT he does have tenderness in the right upper quadrant but LFTs are normal discussed with Tamera Garcia ultrasound was positive for cholecystitis as well. And that is where his sepsis is coming from discussed with surgery he is not a surgical candidate and he would need percutaneous cholecystostomy hence I do not have IR here today not till Monday and family anyhow prefers to to have patient transferred closer to them at [facility] - accepted by surgery service Dr Elma Covarrubias   · Awaiting for bed transport  · Keep npo   · Pain control   · Iv abx   · Iv fluids     Hyperammonemia ([address])  Assessment & Plan  · Suspect in relation to Depakote as he has no history of liver cirrhosis. s/p L-carnitine 100 mg/kg IV over 30 minutes followed by 50 mg/kg every 8 hours ammonia now normal. His encephalopathy is multifactorial so will dc further l- carnitine. Hold depakote for now    Acute kidney injury [facility])  Assessment & Plan  · Secondary to severe sepsis prerenal although there is no obstructive uropathy on the CT abdomen and pelvis has improved discussed with nephrology as well continue Isolyte but decrease to 100 mill per hour good urine output    Acute metabolic encephalopathy  Assessment & Plan  · Usual baseline is he is awake and oriented to self only he does get combative he does groan. Patient has history of dementia , neurosyphilis and CVA currently he is lethargic but able to state his name. Is multifactorial and severe sepsis with infectious etiology CT of the brain is negative for acute pathology.   Also has hyperammonemia and suspect secondary to Depakote as he has no history of liver cirrhosis. · Depakote level was done and was subtherapeutic actually. TSH was normal he is status post L-carnitine infusion his ammonia level is normal today we will discontinue further as his encephalopathy could be multifactorial.  · Last night he stopped talking there was a concern for stroke but he was found to have lockjaw and shaking more suspicious of a seizure he was given Ativan started on Keppra discussed with the neurology today continue Keppra increased to 750 mg IV every 12 hours eventually would need to be transition to Depakote versus Lamictal as Keppra can increase behavioral issues. Patient is being transferred to another hospital follow EEG there MRI done without any acute abnormality just chronic infarcts. · Seizures increased secondary to metabolic derangements and infectious etiology. He did use Depakote for mood stabilizer and not for seizures over the level being subtherapeutic could have prompted that as well secondary to having risk factors as stated above. · Has no lockjaw he has no tremors he is able to wake up and tell me his name and go back to sleep still pretty lethargic. He does get combative at times I did have to give him Haldol 5 mg x 1 as Effexor was not working in the afternoon admits to be placed.         Medical Problems     Resolved Problems  Date Reviewed: 10/7/2023   None       Discharging Physician / Practitioner: Adrianna Edwards MD  PCP: Geneva Alfaro MD  Admission Date:   Admission Orders (From admission, onward)     Ordered        10/05/23 1146  INPATIENT ADMISSION  Once                      Discharge Date: 10/07/23    Consultations During Hospital Stay:  · Nephrology  · Neurology  · General surgery    Procedures Performed:   · None    Significant Findings / Test Results:   · CT head no acute finding  · CT C-spine acute findings  · CT chest abdomen and pelvis-Cholelithiasis with pericholecystic fluid and mild surrounding fat stranding in keeping with acute cholecystitis.     Diffuse proximal and mid small bowel distention without a transition point in keeping with a partial obstruction or enteritis. Fluid throughout the length of the colon which is nondistended is in keeping with a nonspecific diarrheal illness. · us of the right upper quadrant- Gallstones and secondary findings of cholecystitis, similar to yesterday's CT. MRI of the brain- No acute intracranial abnormality. No mesial temporal sclerosis given motion artifact.     Chronic sequela of hemorrhagic infarct in right basal ganglia, chronic hemosiderin deposition in right periventricular white matter adjacent to right thalamus likely sequela of remote injury, chronic multifocal infarcts in bilateral cerebral hemispheres,   and moderate chronic microangiopathy.     Scattered chronic microhemorrhages in bilateral cerebral hemispheres, which may be due to combination of hypertensive arteriopathy and cerebral amyloid angiopathy.     · Probable small right nasal polyp. Recommend   · TTE-  Left Ventricle: Left ventricular cavity size is normal. Wall thickness is mildly increased. The left ventricular ejection fraction is 61% by biplane measurement. . Systolic function is normal. Although no diagnostic regional wall motion abnormality was identified, this possibility cannot be completely excluded on the basis of this study. Diastolic function is mildly abnormal, consistent with grade I (abnormal) relaxation. •  Right Ventricle: Right ventricular cavity size is mildly dilated. Systolic function is normal.  •  Aorta: The aortic root is mildly dilated. •  IVC/SVC: The right atrial pressure is estimated at 3.0 mmHg. The inferior vena cava is normal in size. Respirophasic changes were normal.  ·   Incidental Findings:   · See above to be followed by the receiving hospital    Test Results Pending at Discharge (will require follow up):    · None     Outpatient Tests Requested:  · none    Complications:  none    Reason for Admission: Severe sepsis    Hospital Course: Baldemar Hernandez is a 61 y.o. male patient who originally presented to the hospital on 10/5/2023 due to severe sepsis secondary to acute cholecystitis and complicated UTI started on IV antibiotics acute metabolic encephalopathy multifactorial found to have severe sepsis infectious etiology there is no evidence of a stroke also had hyperammonemia for which L carnitine was given secondary to chronic Depakote and it has resolved ammonia level was normal he also ended up having a new onset seizure for which neurology evaluated see details of their consult that he was started on Keppra but secondary to his history of dementia with mood issues he may need to be switched to Depakote or Lamictal and that could be determined by the receiving hospital.  EEG can be done there as well. Dynamically stable. Patient's family did request the patient to be transferred to [facility] where they reside for any procedural interventions if need to be done. And patient is not a surgical candidate as discussed with the surgery team and he needs a percutaneous cholecystostomy done by IR. Patient has been accepted by the surgery service Dr. Divina Marcano. Please see above list of diagnoses and related plan for additional information. Condition at Discharge: stable    Discharge Day Visit / Exam:   * Please refer to separate progress note for these details *    Discussion with Family: Updated  (sister) via phone. Discharge instructions/Information to patient and family:   See after visit summary for information provided to patient and family. Provisions for Follow-Up Care:  See after visit summary for information related to follow-up care and any pertinent home health orders.        Disposition:   [address] Transfer to [facility]    Planned Readmission: no     Discharge Statement:  I spent >35 minutes discharging the patient. This time was spent on the day of discharge. I had direct contact with the patient on the day of discharge. Greater than 50% of the total time was spent examining patient, answering all patient questions, arranging and discussing plan of care with patient as well as directly providing post-discharge instructions. Additional time then spent on discharge activities. Discharge Medications:  See after visit summary for reconciled discharge medications provided to patient and/or family.       **Please Note: This note may have been constructed using a voice recognition system**

## 2023-10-07 NOTE — ASSESSMENT & PLAN NOTE
· Evident on CT he does have tenderness in the right upper quadrant but LFTs are normal discussed with Addie Becerra ultrasound was positive for cholecystitis as well.   And that is where his sepsis is coming from discussed with surgery he is not a surgical candidate and he would need percutaneous cholecystostomy hence I do not have IR here today not till Monday and family anyhow prefers to to have patient transferred closer to them at [facility] - accepted by surgery service Dr Delgado Mail   · Awaiting for bed transport  · Keep npo   · Pain control   · Iv abx   · Iv fluids

## 2023-10-07 NOTE — NURSING NOTE
Transport with Deni Pedraza via litter at [employer] p/u. Sent with IV Isolyte 100mL/h by gravity, Incontinence care provided prior to leaving. Expiratory wheeze noted. Report called to [address]  Josesito Peraza RN.

## 2023-10-08 LAB
BACTERIA BLD CULT: NORMAL
BACTERIA BLD CULT: NORMAL
BACTERIA UR CULT: ABNORMAL

## 2023-10-09 NOTE — UTILIZATION REVIEW
NOTIFICATION OF ADMISSION DISCHARGE   This is a Notification of Discharge from [facility]. Please be advised that this patient has been discharge from our facility. Below you will find the admission and discharge date and time including the patient’s disposition. UTILIZATION REVIEW CONTACT:  Macario Do  Utilization   Network Utilization Review Department  Phone: [phone] x carefully listen to the prompts. All voicemails are confidential.  Email: [email or web address]     ADMISSION INFORMATION  PRESENTATION DATE: 10/5/2023  9:25 AM  OBERVATION ADMISSION DATE:   INPATIENT ADMISSION DATE: 10/5/23 11:46 AM   DISCHARGE DATE: 10/7/2023 12:36 AM   DIS[facility]ITION:[facility] Term [employer] Utilization Review Department  ATTENTION: Please call with any questions or concerns to [phone] and carefully listen to the prompts so that you are directed to the right person. All voicemails are confidential.   For Discharge needs, contact Care Management DC Support Team at [phone] opt. 2  Send all requests for admission clinical reviews, approved or denied determinations and any other requests to dedicated fax number below belonging to the campus where the patient is receiving treatment.  List of dedicated fax numbers for the Facilities:  [place] DENIALS (Administrative/Medical Necessity) [phone]   DISCHARGE SUPPORT TEAM (Network) [phone] [address] [facility] (Maternity/NICU/Pediatrics) [phone]   [address] [place] [address] Drive [phone]   [facility] [phone] [address] [place] [address] [address] [place] [address] Kerri [phone]   [address] [id number] [place] [address] [phone]

## 2023-10-10 LAB
BACTERIA BLD CULT: NORMAL
BACTERIA BLD CULT: NORMAL